# Patient Record
Sex: FEMALE | Race: WHITE | Employment: FULL TIME | URBAN - METROPOLITAN AREA
[De-identification: names, ages, dates, MRNs, and addresses within clinical notes are randomized per-mention and may not be internally consistent; named-entity substitution may affect disease eponyms.]

---

## 2017-03-08 DIAGNOSIS — R82.90 ABNORMAL FINDING IN URINE: ICD-10-CM

## 2017-03-08 DIAGNOSIS — Z79.899 HIGH RISK MEDICATION USE: Primary | ICD-10-CM

## 2017-03-08 LAB
ALBUMIN UR-MCNC: 30 MG/DL
APPEARANCE UR: CLEAR
BACTERIA #/AREA URNS HPF: ABNORMAL /HPF
BILIRUB UR QL STRIP: ABNORMAL
COLOR UR AUTO: YELLOW
GLUCOSE UR STRIP-MCNC: NEGATIVE MG/DL
HGB UR QL STRIP: ABNORMAL
KETONES UR STRIP-MCNC: 40 MG/DL
LEUKOCYTE ESTERASE UR QL STRIP: NEGATIVE
MUCOUS THREADS #/AREA URNS LPF: PRESENT /LPF
NITRATE UR QL: POSITIVE
NON-SQ EPI CELLS #/AREA URNS LPF: ABNORMAL /LPF
PH UR STRIP: 6 PH (ref 5–7)
RBC #/AREA URNS AUTO: ABNORMAL /HPF (ref 0–2)
SP GR UR STRIP: >1.03 (ref 1–1.03)
URN SPEC COLLECT METH UR: ABNORMAL
UROBILINOGEN UR STRIP-ACNC: 1 EU/DL (ref 0.2–1)
WBC #/AREA URNS AUTO: ABNORMAL /HPF (ref 0–2)

## 2017-03-08 PROCEDURE — 84443 ASSAY THYROID STIM HORMONE: CPT

## 2017-03-08 PROCEDURE — 84439 ASSAY OF FREE THYROXINE: CPT

## 2017-03-08 PROCEDURE — 87086 URINE CULTURE/COLONY COUNT: CPT

## 2017-03-08 PROCEDURE — 87088 URINE BACTERIA CULTURE: CPT

## 2017-03-08 PROCEDURE — 80048 BASIC METABOLIC PNL TOTAL CA: CPT

## 2017-03-08 PROCEDURE — 36415 COLL VENOUS BLD VENIPUNCTURE: CPT

## 2017-03-08 PROCEDURE — 81001 URINALYSIS AUTO W/SCOPE: CPT

## 2017-03-08 PROCEDURE — 80178 ASSAY OF LITHIUM: CPT

## 2017-03-08 PROCEDURE — 87186 SC STD MICRODIL/AGAR DIL: CPT

## 2017-03-09 LAB
ANION GAP SERPL CALCULATED.3IONS-SCNC: 7 MMOL/L (ref 3–14)
BUN SERPL-MCNC: 12 MG/DL (ref 7–30)
CALCIUM SERPL-MCNC: 9.7 MG/DL (ref 8.5–10.1)
CHLORIDE SERPL-SCNC: 107 MMOL/L (ref 94–109)
CO2 SERPL-SCNC: 26 MMOL/L (ref 20–32)
CREAT SERPL-MCNC: 0.66 MG/DL (ref 0.52–1.04)
GFR SERPL CREATININE-BSD FRML MDRD: NORMAL ML/MIN/1.7M2
GLUCOSE SERPL-MCNC: 84 MG/DL (ref 70–99)
LITHIUM SERPL-SCNC: <0.2 MMOL/L (ref 0.6–1.2)
POTASSIUM SERPL-SCNC: 4.1 MMOL/L (ref 3.4–5.3)
SODIUM SERPL-SCNC: 140 MMOL/L (ref 133–144)
T4 FREE SERPL-MCNC: 1.55 NG/DL (ref 0.76–1.46)
TSH SERPL DL<=0.05 MIU/L-ACNC: 0.79 MU/L (ref 0.4–4)

## 2017-03-10 LAB
BACTERIA SPEC CULT: ABNORMAL
MICRO REPORT STATUS: ABNORMAL
MICROORGANISM SPEC CULT: ABNORMAL
SPECIMEN SOURCE: ABNORMAL

## 2017-08-15 ENCOUNTER — ALLIED HEALTH/NURSE VISIT (OUTPATIENT)
Dept: NURSING | Facility: CLINIC | Age: 32
End: 2017-08-15
Payer: COMMERCIAL

## 2017-08-15 DIAGNOSIS — Z11.1 SCREENING EXAMINATION FOR PULMONARY TUBERCULOSIS: Primary | ICD-10-CM

## 2017-08-15 PROCEDURE — 86580 TB INTRADERMAL TEST: CPT

## 2017-08-15 NOTE — MR AVS SNAPSHOT
After Visit Summary   8/15/2017    Alexandra Ruiz    MRN: [id number]           Patient Information     Date Of Birth          1985        Visit Information        Provider Department      8/15/2017 10:30 AM NE ANCILLARY [facility]        Today's Diagnoses     Screening examination for pulmonary tuberculosis    -  1       Follow-ups after your visit        Your next 10 appointments already scheduled     Aug 17, 2017  1:00 PM CDT   Nurse Only with NE RN   [facility]    [address] [place] [id number] [phone]              Who to contact     If you have questions or need follow up information about today's clinic visit or your schedule please contact [facility] directly at [phone].  Normal or non-critical lab and imaging results will be communicated to you by MyChart, letter or phone within 4 business days after the clinic has received the results. If you do not hear from us within 7 days, please contact the clinic through MyChart or phone. If you have a critical or abnormal lab result, we will notify you by phone as soon as possible.  Submit refill requests through [employer] or call your pharmacy and they will forward the refill request to us. Please allow 3 business days for your refill to be completed.          Additional Information About Your Visit        MyChart Information     [employer] gives you secure access to your electronic health record. If you see a primary care provider, you can also send messages to your care team and make appointments. If you have questions, please call your primary care clinic.  If you do not have a primary care provider, please call [phone] and they will assist you.        Care EveryWhere ID     This is your Care EveryWhere ID. This could be used by other organizations to access your [place] medical records  [id number]         Blood  Pressure from Last 3 Encounters:   05/20/14 116/71   11/26/12 107/64   09/09/12 92/61    Weight from Last 3 Encounters:   05/20/14 144 lb (65.3 kg)   11/26/12 147 lb 3.2 oz (66.8 kg)   09/05/12 182 lb (82.6 kg)              We Performed the Following     TB INTRADERMAL TEST        Primary Care Provider    Physician No Ref-Primary       No address on file        Equal Access to Services     KENDALL CASTELLON : Hadii aad ku hadasho Soomaali, waaxda luqadaha, qaybta kaalmada adeegyada, waxay idiin hayaan adeeg luciebritton [place] . So [facility] [phone].    ATENCIÓN: Si ying montiel, tiene a parra disposición servicios gratuitos de asistencia lingüística. Llame al [phone].    We comply with applicable federal civil rights laws and [place] laws. We do not discriminate on the basis of race, color, national origin, age, disability sex, sexual orientation or gender identity.            Thank you!     Thank you for choosing [facility]  for your care. Our goal is always to provide you with excellent care. Hearing back from our patients is one way we can continue to improve our services. Please take a few minutes to complete the written survey that you may receive in the mail after your visit with us. Thank you!             Your Updated Medication List - Protect others around you: Learn how to safely use, store and throw away your medicines at www.disposemymeds.org.          This list is accurate as of: 8/15/17 10:38 AM.  Always use your most recent med list.                   Brand Name Dispense Instructions for use Diagnosis    amphetamine-dextroamphetamine 15 MG per 24 hr capsule    ADDERALL XR     Take 15 mg by mouth 2 times daily        norethindrone-ethinyl estradiol 1-20 MG-MCG per tablet    MICROGESTIN 1/20    3 Package    Take 1 tablet by mouth daily.    Routine postpartum follow-up       PRENATAL PO      Take  by mouth daily.        venlafaxine 75 MG 24 hr capsule    EFFEXOR-XR     Take by mouth daily         ZINC PO      Take 100 mg by mouth daily.

## 2017-08-15 NOTE — NURSING NOTE
The patient is asked the following questions today and these are her answers:    -Have you had a mantoux administered in the past 30 days?    No  -Have you had a previous positive Mantoux.  No  -Have you received BCG in the past.  No  -Have you had a live vaccine  (MMR, Varicella, OPV, Yellow Fever) in the last 6 weeks.  No  -Have you had and active  viral or bacterial infection in the past 6 weeks.  No  -Have you received corticosteroids or immunosuppressive agents in the past 6 weeks.  No  -Have you been diagnosed with HIV?  No  -Do you have a maglinancy?  No     Landy Burnette MA

## 2017-08-17 ENCOUNTER — ALLIED HEALTH/NURSE VISIT (OUTPATIENT)
Dept: NURSING | Facility: CLINIC | Age: 32
End: 2017-08-17

## 2017-08-17 DIAGNOSIS — Z11.1 SCREENING EXAMINATION FOR PULMONARY TUBERCULOSIS: Primary | ICD-10-CM

## 2017-08-17 LAB
PPDINDURATION: 0 MM (ref 0–5)
PPDREDNESS: 0 MM

## 2017-08-17 NOTE — MR AVS SNAPSHOT
After Visit Summary   8/17/2017    Alexandra Ruiz    MRN: [id number]           Patient Information     Date Of Birth          1985        Visit Information        Provider Department      8/17/2017 1:00 PM NE RN [facility]        Today's Diagnoses     Screening examination for pulmonary tuberculosis    -  1       Follow-ups after your visit        Who to contact     If you have questions or need follow up information about today's clinic visit or your schedule please contact [facility] directly at [phone].  Normal or non-critical lab and imaging results will be communicated to you by MyChart, letter or phone within 4 business days after the clinic has received the results. If you do not hear from us within 7 days, please contact the clinic through [employer] or phone. If you have a critical or abnormal lab result, we will notify you by phone as soon as possible.  Submit refill requests through [employer] or call your pharmacy and they will forward the refill request to us. Please allow 3 business days for your refill to be completed.          Additional Information About Your Visit        MyChart Information     [employer] gives you secure access to your electronic health record. If you see a primary care provider, you can also send messages to your care team and make appointments. If you have questions, please call your primary care clinic.  If you do not have a primary care provider, please call [phone] and they will assist you.        Care EveryWhere ID     This is your Care EveryWhere ID. This could be used by other organizations to access your [place] medical records  [id number]         Blood Pressure from Last 3 Encounters:   05/20/14 116/71   11/26/12 107/64   09/09/12 92/61    Weight from Last 3 Encounters:   05/20/14 144 lb (65.3 kg)   11/26/12 147 lb 3.2 oz (66.8 kg)   09/05/12 182 lb (82.6 kg)              Today, you had the following      No orders found for display       Primary Care Provider    Physician No Ref-Primary       No address on file        Equal Access to Services     KENDALL RAVINDER : Hadii mihaela sam kinjal Vaughn, veronikada jesarabella, trae neisharadha jimiroverto, waxkaran lalit mariyaluis krauseluna eason julius coreas. So [facility] [phone].    ATENCIÓN: Si habla español, tiene a parra disposición servicios gratuitos de asistencia lingüística. Llame al [phone].    We comply with applicable federal civil rights laws and [place] laws. We do not discriminate on the basis of race, color, national origin, age, disability sex, sexual orientation or gender identity.            Thank you!     Thank you for choosing [facility]  for your care. Our goal is always to provide you with excellent care. Hearing back from our patients is one way we can continue to improve our services. Please take a few minutes to complete the written survey that you may receive in the mail after your visit with us. Thank you!             Your Updated Medication List - Protect others around you: Learn how to safely use, store and throw away your medicines at www.disposemymeds.org.          This list is accurate as of: 8/17/17  1:05 PM.  Always use your most recent med list.                   Brand Name Dispense Instructions for use Diagnosis    amphetamine-dextroamphetamine 15 MG per 24 hr capsule    ADDERALL XR     Take 15 mg by mouth 2 times daily        norethindrone-ethinyl estradiol 1-20 MG-MCG per tablet    MICROGESTIN 1/20    3 Package    Take 1 tablet by mouth daily.    Routine postpartum follow-up       PRENATAL PO      Take  by mouth daily.        venlafaxine 75 MG 24 hr capsule    EFFEXOR-XR     Take by mouth daily        ZINC PO      Take 100 mg by mouth daily.

## 2017-08-17 NOTE — PROGRESS NOTES
Mantoux results NEGATIVE. No induration.  No swelling.  No redness..  Stephanie Phelan,Clinic Rn  [place]

## 2017-09-12 ENCOUNTER — ALLIED HEALTH/NURSE VISIT (OUTPATIENT)
Dept: NURSING | Facility: CLINIC | Age: 32
End: 2017-09-12
Payer: COMMERCIAL

## 2017-09-12 DIAGNOSIS — Z11.1 SCREENING EXAMINATION FOR PULMONARY TUBERCULOSIS: Primary | ICD-10-CM

## 2017-09-12 PROCEDURE — 86580 TB INTRADERMAL TEST: CPT

## 2017-09-12 NOTE — PROGRESS NOTES
The patient is asked the following questions today and these are her answers:    -Have you had a mantoux administered in the past 30 days?    Two step mantoux  -Have you had a previous positive Mantoux.  No  -Have you received BCG in the past.  No  -Have you had a live vaccine  (MMR, Varicella, OPV, Yellow Fever) in the last 6 weeks.  No  -Have you had and active  viral or bacterial infection in the past 6 weeks.  No  -Have you received corticosteroids or immunosuppressive agents in the past 6 weeks.  No  -Have you been diagnosed with HIV?  No  -Do you have a maglinancy?  No   Mary Dickinson CMA (AAMA)

## 2017-09-12 NOTE — MR AVS SNAPSHOT
After Visit Summary   9/12/2017    Alexandra Ruiz    MRN: [id number]           Patient Information     Date Of Birth          1985        Visit Information        Provider Department      9/12/2017 1:30 PM NE ANCILLARY [facility]        Today's Diagnoses     Screening examination for pulmonary tuberculosis    -  1       Follow-ups after your visit        Your next 10 appointments already scheduled     Sep 15, 2017  8:30 AM CDT   Nurse Only with NE RN   [facility]    [address] [place] [id number] [phone]              Who to contact     If you have questions or need follow up information about today's clinic visit or your schedule please contact [facility] directly at [phone].  Normal or non-critical lab and imaging results will be communicated to you by MyChart, letter or phone within 4 business days after the clinic has received the results. If you do not hear from us within 7 days, please contact the clinic through MyChart or phone. If you have a critical or abnormal lab result, we will notify you by phone as soon as possible.  Submit refill requests through "[employer] or call your pharmacy and they will forward the refill request to us. Please allow 3 business days for your refill to be completed.          Additional Information About Your Visit        MyChart Information     "[employer] gives you secure access to your electronic health record. If you see a primary care provider, you can also send messages to your care team and make appointments. If you have questions, please call your primary care clinic.  If you do not have a primary care provider, please call [phone] and they will assist you.        Care EveryWhere ID     This is your Care EveryWhere ID. This could be used by other organizations to access your [place] medical records  [id number]         Blood Pressure  from Last 3 Encounters:   05/20/14 116/71   11/26/12 107/64   09/09/12 92/61    Weight from Last 3 Encounters:   05/20/14 144 lb (65.3 kg)   11/26/12 147 lb 3.2 oz (66.8 kg)   09/05/12 182 lb (82.6 kg)              We Performed the Following     TB INTRADERMAL TEST     VACCINE ADMINISTRATION, INITIAL        Primary Care Provider    Physician No Ref-Primary       No address on file        Equal Access to Services     KENDALL CASTELLON : Hadii aad ku hadasho Soomaali, waaxda luqadaha, qaybta kaalmada adeegyada, waxay sumanin mariyan clara patiñomisaelbritton [place] . So [facility] [phone].    ATENCIÓN: Si ying montiel, tiene a parra disposición servicios gratuitos de asistencia lingüística. Llame al [phone].    We comply with applicable federal civil rights laws and [place] laws. We do not discriminate on the basis of race, color, national origin, age, disability sex, sexual orientation or gender identity.            Thank you!     Thank you for choosing [facility]  for your care. Our goal is always to provide you with excellent care. Hearing back from our patients is one way we can continue to improve our services. Please take a few minutes to complete the written survey that you may receive in the mail after your visit with us. Thank you!             Your Updated Medication List - Protect others around you: Learn how to safely use, store and throw away your medicines at www.disposemymeds.org.          This list is accurate as of: 9/12/17  2:32 PM.  Always use your most recent med list.                   Brand Name Dispense Instructions for use Diagnosis    amphetamine-dextroamphetamine 15 MG per 24 hr capsule    ADDERALL XR     Take 15 mg by mouth 2 times daily        norethindrone-ethinyl estradiol 1-20 MG-MCG per tablet    MICROGESTIN 1/20    3 Package    Take 1 tablet by mouth daily.    Routine postpartum follow-up       PRENATAL PO      Take  by mouth daily.        venlafaxine 75 MG 24 hr capsule     EFFEXOR-XR     Take by mouth daily        ZINC PO      Take 100 mg by mouth daily.

## 2017-09-22 ENCOUNTER — ALLIED HEALTH/NURSE VISIT (OUTPATIENT)
Dept: NURSING | Facility: CLINIC | Age: 32
End: 2017-09-22
Payer: COMMERCIAL

## 2017-09-22 DIAGNOSIS — Z11.1 SCREENING EXAMINATION FOR PULMONARY TUBERCULOSIS: Primary | ICD-10-CM

## 2017-09-22 PROCEDURE — 86580 TB INTRADERMAL TEST: CPT

## 2017-09-22 NOTE — MR AVS SNAPSHOT
After Visit Summary   9/22/2017    Alexandra Ruiz    MRN: [id number]           Patient Information     Date Of Birth          1985        Visit Information        Provider Department      9/22/2017 10:30 AM NE ANCILLARY [facility]        Today's Diagnoses     Screening examination for pulmonary tuberculosis    -  1       Follow-ups after your visit        Your next 10 appointments already scheduled     Sep 25, 2017  8:30 AM CDT   Nurse Only with NE RN   [facility]    [address] [place] [id number] [phone]              Who to contact     If you have questions or need follow up information about today's clinic visit or your schedule please contact [facility] directly at [phone].  Normal or non-critical lab and imaging results will be communicated to you by MyChart, letter or phone within 4 business days after the clinic has received the results. If you do not hear from us within 7 days, please contact the clinic through MyChart or phone. If you have a critical or abnormal lab result, we will notify you by phone as soon as possible.  Submit refill requests through [employer] or call your pharmacy and they will forward the refill request to us. Please allow 3 business days for your refill to be completed.          Additional Information About Your Visit        MyChart Information     [employer] gives you secure access to your electronic health record. If you see a primary care provider, you can also send messages to your care team and make appointments. If you have questions, please call your primary care clinic.  If you do not have a primary care provider, please call [phone] and they will assist you.        Care EveryWhere ID     This is your Care EveryWhere ID. This could be used by other organizations to access your [place] medical records  [id number]         Blood  Pressure from Last 3 Encounters:   05/20/14 116/71   11/26/12 107/64   09/09/12 92/61    Weight from Last 3 Encounters:   05/20/14 144 lb (65.3 kg)   11/26/12 147 lb 3.2 oz (66.8 kg)   09/05/12 182 lb (82.6 kg)              We Performed the Following     TB INTRADERMAL TEST        Primary Care Provider    Physician No Ref-Primary       No address on file        Equal Access to Services     KENDALL CASTELLON : Hadii aad ku hadasho Soomaali, waaxda luqadaha, qaybta kaalmada adeegyada, waxay idiin hayaan adeeg luciebritton [place] . So [facility] [phone].    ATENCIÓN: Si ying montiel, tiene a parra disposición servicios gratuitos de asistencia lingüística. Llame al [phone].    We comply with applicable federal civil rights laws and [place] laws. We do not discriminate on the basis of race, color, national origin, age, disability sex, sexual orientation or gender identity.            Thank you!     Thank you for choosing [facility]  for your care. Our goal is always to provide you with excellent care. Hearing back from our patients is one way we can continue to improve our services. Please take a few minutes to complete the written survey that you may receive in the mail after your visit with us. Thank you!             Your Updated Medication List - Protect others around you: Learn how to safely use, store and throw away your medicines at www.disposemymeds.org.          This list is accurate as of: 9/22/17 10:59 AM.  Always use your most recent med list.                   Brand Name Dispense Instructions for use Diagnosis    amphetamine-dextroamphetamine 15 MG per 24 hr capsule    ADDERALL XR     Take 15 mg by mouth 2 times daily        norethindrone-ethinyl estradiol 1-20 MG-MCG per tablet    MICROGESTIN 1/20    3 Package    Take 1 tablet by mouth daily.    Routine postpartum follow-up       PRENATAL PO      Take  by mouth daily.        venlafaxine 75 MG 24 hr capsule    EFFEXOR-XR     Take by mouth daily         ZINC PO      Take 100 mg by mouth daily.

## 2017-09-22 NOTE — NURSING NOTE
The following medication was given:     MEDICATION: MANTOUX PLACEMENT  ROUTE: ID  SITE: Forearm - Right  DOSE: 0.1mL  LOT #: [id number]  :  [employer]  EXPIRATION DATE:  11-16-18  Mariya Fung CMA

## 2017-09-22 NOTE — NURSING NOTE
The patient is asked the following questions today and these are her answers:    -Have you had a mantoux administered in the past 30 days?    Yes - doing 2 step mantoux  -Have you had a previous positive Mantoux.  No  -Have you received BCG in the past.  No  -Have you had a live vaccine  (MMR, Varicella, OPV, Yellow Fever) in the last 6 weeks.  No  -Have you had and active  viral or bacterial infection in the past 6 weeks.  No  -Have you received corticosteroids or immunosuppressive agents in the past 6 weeks.  No  -Have you been diagnosed with HIV?  No  -Do you have a maglinancy?  No   Mariya Fung, [facility]

## 2017-09-23 ENCOUNTER — NURSE TRIAGE (OUTPATIENT)
Dept: NURSING | Facility: CLINIC | Age: 32
End: 2017-09-23

## 2017-09-24 ENCOUNTER — TRANSFERRED RECORDS (OUTPATIENT)
Dept: HEALTH INFORMATION MANAGEMENT | Facility: CLINIC | Age: 32
End: 2017-09-24

## 2017-09-24 NOTE — TELEPHONE ENCOUNTER
"  Reason for Disposition    [1] SEVERE pain (e.g., excruciating, unable to do any normal activities) AND [2] not improved 2 hours after pain medicine    Additional Information    Negative: Sounds like a life-threatening emergency to the triager    Negative: Tooth knocked out    Negative: [1] Bleeding present > 30 minutes AND [2] using correct technique of direct pressure    Negative: [1] Bleeding now AND [2] second call after being instructed in correct technique of direct pressure    Negative: [1] Has packing sutured over socket (extraction site) AND [2] now bleeding around the packing (Exception: few drops or ooze)    Negative: Tongue is very swollen and tender    Negative: [1] Face is swollen AND [2] fever    Negative: Patient sounds very sick or weak to the triager    Answer Assessment - Initial Assessment Questions  1. SYMPTOM: \"What's the main symptom you're concerned about?\" (e.g., bleeding, pain, fever)      pain  2. ONSET: \"When did the ________  start?\"      Last week  3. DATE of TOOTH EXTRACTION: \"When was surgery performed?\"       9/21/17  4. BLEEDING: \"Is there any bleeding?\" If so, ask: \"How much?\"       no  5. PAIN: \"Is there any pain?\" If so, ask: \"How bad is it?\"  (Scale 1-10; or mild, moderate, severe)      severe  6. FEVER: \"Do you have a fever?\" If so, ask: \"What is your temperature, how was it measured, and when did it start?\"      no  7. OTHER SYMPTOMS: \"Do you have any other symptoms?\" (e.g., face swelling)      Swollen glands on neck    Protocols used: TOOTH EXTRACTION-ADULT-AH    "

## 2017-09-25 ENCOUNTER — ALLIED HEALTH/NURSE VISIT (OUTPATIENT)
Dept: NURSING | Facility: CLINIC | Age: 32
End: 2017-09-25
Payer: COMMERCIAL

## 2017-09-25 DIAGNOSIS — Z11.1 SCREENING EXAMINATION FOR PULMONARY TUBERCULOSIS: Primary | ICD-10-CM

## 2017-09-25 LAB
PPDINDURATION: 0 MM (ref 0–5)
PPDREDNESS: 0 MM

## 2017-09-25 PROCEDURE — 99207 ZZC NO CHARGE NURSE ONLY: CPT

## 2017-09-25 NOTE — PROGRESS NOTES
Mantoux results: No induration.  No swelling.  No redness.  Stephanie Phelan,Clinic Rn  [place]

## 2017-09-25 NOTE — MR AVS SNAPSHOT
After Visit Summary   9/25/2017    Alexandra Ruiz    MRN: [id number]           Patient Information     Date Of Birth          1985        Visit Information        Provider Department      9/25/2017 8:30 AM NE RN [facility]        Today's Diagnoses     Screening examination for pulmonary tuberculosis    -  1       Follow-ups after your visit        Your next 10 appointments already scheduled     Oct 09, 2017  1:00 PM CDT   Office Visit with Monik Lew PA-C   [facility] ([facility])    [address] [place] [id number] [phone]           Bring a current list of meds and any records pertaining to this visit. For Physicals, please bring immunization records and any forms needing to be filled out. Please arrive 10 minutes early to complete paperwork.              Who to contact     If you have questions or need follow up information about today's clinic visit or your schedule please contact [facility] directly at [phone].  Normal or non-critical lab and imaging results will be communicated to you by Platypihart, letter or phone within 4 business days after the clinic has received the results. If you do not hear from us within 7 days, please contact the clinic through Gremlnt or phone. If you have a critical or abnormal lab result, we will notify you by phone as soon as possible.  Submit refill requests through [employer] or call your pharmacy and they will forward the refill request to us. Please allow 3 business days for your refill to be completed.          Additional Information About Your Visit        PlatypiharAdvizzer Information     [employer] gives you secure access to your electronic health record. If you see a primary care provider, you can also send messages to your care team and make appointments. If you have questions, please call your primary care clinic.  If you do not have a primary care  provider, please call [phone] and they will assist you.        Care EveryWhere ID     This is your Care EveryWhere ID. This could be used by other organizations to access your [place] medical records  [id number]         Blood Pressure from Last 3 Encounters:   05/20/14 116/71   11/26/12 107/64   09/09/12 92/61    Weight from Last 3 Encounters:   05/20/14 144 lb (65.3 kg)   11/26/12 147 lb 3.2 oz (66.8 kg)   09/05/12 182 lb (82.6 kg)              Today, you had the following     No orders found for display       Primary Care Provider Office Phone # Fax #    Monik Lew PA-C [phone]       [address] [facility] [address]        Equal Access to Services     KENDALL CASTELLON : Hadii aad ku hadasho Soomaali, waaxda luqadaha, qaybta kaalmada adeegyada, gianni cohn haypaula [place] . So [facility] [phone].    ATENCIÓN: Si habla español, tiene a parra disposición servicios gratuitos de asistencia lingüística. Zoila al [phone].    We comply with applicable federal civil rights laws and [place] laws. We do not discriminate on the basis of race, color, national origin, age, disability sex, sexual orientation or gender identity.            Thank you!     Thank you for choosing [facility]  for your care. Our goal is always to provide you with excellent care. Hearing back from our patients is one way we can continue to improve our services. Please take a few minutes to complete the written survey that you may receive in the mail after your visit with us. Thank you!             Your Updated Medication List - Protect others around you: Learn how to safely use, store and throw away your medicines at www.disposemymeds.org.          This list is accurate as of: 9/25/17  9:12 AM.  Always use your most recent med list.                   Brand Name Dispense Instructions for use Diagnosis    amphetamine-dextroamphetamine 15 MG per 24 hr capsule    ADDERALL XR      Take 15 mg by mouth 2 times daily        norethindrone-ethinyl estradiol 1-20 MG-MCG per tablet    MICROGESTIN 1/20    3 Package    Take 1 tablet by mouth daily.    Routine postpartum follow-up       PRENATAL PO      Take  by mouth daily.        venlafaxine 75 MG 24 hr capsule    EFFEXOR-XR     Take by mouth daily        ZINC PO      Take 100 mg by mouth daily.

## 2017-10-09 ENCOUNTER — OFFICE VISIT (OUTPATIENT)
Dept: FAMILY MEDICINE | Facility: CLINIC | Age: 32
End: 2017-10-09
Payer: COMMERCIAL

## 2017-10-09 VITALS
HEIGHT: 66 IN | DIASTOLIC BLOOD PRESSURE: 64 MMHG | SYSTOLIC BLOOD PRESSURE: 112 MMHG | WEIGHT: 162 LBS | TEMPERATURE: 98.8 F | BODY MASS INDEX: 26.03 KG/M2 | HEART RATE: 80 BPM

## 2017-10-09 DIAGNOSIS — R21 RASH AND NONSPECIFIC SKIN ERUPTION: Primary | ICD-10-CM

## 2017-10-09 DIAGNOSIS — E83.2 ACRODERMATITIS ENTEROPATHICA: ICD-10-CM

## 2017-10-09 PROCEDURE — 84630 ASSAY OF ZINC: CPT | Mod: 90 | Performed by: PHYSICIAN ASSISTANT

## 2017-10-09 PROCEDURE — 84439 ASSAY OF FREE THYROXINE: CPT | Performed by: PHYSICIAN ASSISTANT

## 2017-10-09 PROCEDURE — 36415 COLL VENOUS BLD VENIPUNCTURE: CPT | Performed by: PHYSICIAN ASSISTANT

## 2017-10-09 PROCEDURE — 99000 SPECIMEN HANDLING OFFICE-LAB: CPT | Performed by: PHYSICIAN ASSISTANT

## 2017-10-09 PROCEDURE — 99214 OFFICE O/P EST MOD 30 MIN: CPT | Performed by: PHYSICIAN ASSISTANT

## 2017-10-09 PROCEDURE — 84443 ASSAY THYROID STIM HORMONE: CPT | Performed by: PHYSICIAN ASSISTANT

## 2017-10-09 NOTE — MR AVS SNAPSHOT
After Visit Summary   10/9/2017    Alexandra Ruiz    MRN: [id number]           Patient Information     Date Of Birth          1985        Visit Information        Provider Department      10/9/2017 1:00 PM Monik Lew PA-C [facility]        Today's Diagnoses     Acrodermatitis enteropathica    -  1    Rash and nonspecific skin eruption          Care Instructions    Call to schedule an appointment with Dermatology.  Monik or her team will be in touch with you with results.    BETY Han PA-C            Follow-ups after your visit        Additional Services     DERMATOLOGY REFERRAL       Your provider has referred you to: [facility]: Dermatology Clinic [facility] [phone]   [email or web address]    Please be aware that coverage of these services is subject to the terms and limitations of your health insurance plan.  Call member services at your health plan with any benefit or coverage questions.      Please bring the following with you to your appointment:    (1) Any X-Rays, CTs or MRIs which have been performed.  Contact the facility where they were done to arrange for  prior to your scheduled appointment.    (2) List of current medications  (3) This referral request   (4) Any documents/labs given to you for this referral                  Who to contact     If you have questions or need follow up information about today's clinic visit or your schedule please contact [facility] directly at [phone].  Normal or non-critical lab and imaging results will be communicated to you by MyChart, letter or phone within 4 business days after the clinic has received the results. If you do not hear from us within 7 days, please contact the clinic through MyChart or phone. If you have a critical or abnormal lab result, we will notify you by phone as soon as possible.  Submit refill requests through  "MyChart or call your pharmacy and they will forward the refill request to us. Please allow 3 business days for your refill to be completed.          Additional Information About Your Visit        MyChart Information     [employer] gives you secure access to your electronic health record. If you see a primary care provider, you can also send messages to your care team and make appointments. If you have questions, please call your primary care clinic.  If you do not have a primary care provider, please call [phone] and they will assist you.        Care EveryWhere ID     This is your Care EveryWhere ID. This could be used by other organizations to access your [place] medical records  [id number]        Your Vitals Were     Pulse Temperature Height BMI (Body Mass Index)          80 98.8  F (37.1  C) (Oral) 5' 6\" (1.676 m) 26.15 kg/m2         Blood Pressure from Last 3 Encounters:   10/09/17 112/64   05/20/14 116/71   11/26/12 107/64    Weight from Last 3 Encounters:   10/09/17 162 lb (73.5 kg)   05/20/14 144 lb (65.3 kg)   11/26/12 147 lb 3.2 oz (66.8 kg)              We Performed the Following     DERMATOLOGY REFERRAL     TSH with free T4 reflex     Zinc        Primary Care Provider Office Phone # Fax #    Monik Lew PA-C [phone]       [address] [facility] [address]        Equal Access to Services     [facility]: Hadii aad ku hadasho Soomaali, waaxda luqadaha, qaybta kaalmada adeegyada, gianni [place] . So [facility] [phone].    ATENCIÓN: Si habla español, tiene a parra disposición servicios gratuitos de asistencia lingüística. Zoila al [phone].    We comply with applicable federal civil rights laws and [place] laws. We do not discriminate on the basis of race, color, national origin, age, disability, sex, sexual orientation, or gender identity.            Thank you!     Thank you for choosing [facility]  for your care. Our " goal is always to provide you with excellent care. Hearing back from our patients is one way we can continue to improve our services. Please take a few minutes to complete the written survey that you may receive in the mail after your visit with us. Thank you!             Your Updated Medication List - Protect others around you: Learn how to safely use, store and throw away your medicines at www.disposemymeds.org.          This list is accurate as of: 10/9/17  2:04 PM.  Always use your most recent med list.                   Brand Name Dispense Instructions for use Diagnosis    amphetamine-dextroamphetamine 15 MG per 24 hr capsule    ADDERALL XR     Take 15 mg by mouth 2 times daily        DULOXETINE HCL PO      Take 20 mg by mouth daily Patient takes 60MG in the AM and 40MG in the PM        etonogestrel 68 MG Impl    IMPLANON/NEXPLANON     1 each by Subdermal route once        LAMOTRIGINE PO      Take 200 mg by mouth daily        QUETIAPINE FUMARATE PO      Take 25 mg by mouth as needed        venlafaxine 75 MG 24 hr capsule    EFFEXOR-XR     Take by mouth daily

## 2017-10-09 NOTE — NURSING NOTE
"Chief Complaint   Patient presents with     Derm Problem     LAB REQUEST     Zinc levels rechecked      LAB REQUEST     Thyroid - father has a history of thyroid cancer.        Initial /64 (BP Location: Right arm, Cuff Size: Adult Regular)  Pulse 80  Temp 98.8  F (37.1  C) (Oral)  Ht 5' 6\" (1.676 m)  Wt 162 lb (73.5 kg)  BMI 26.15 kg/m2 Estimated body mass index is 26.15 kg/(m^2) as calculated from the following:    Height as of this encounter: 5' 6\" (1.676 m).    Weight as of this encounter: 162 lb (73.5 kg).  Medication Reconciliation: complete     Angela Mcguire CMA (AAMA)      "

## 2017-10-09 NOTE — PATIENT INSTRUCTIONS
Call to schedule an appointment with Dermatology.  Monik or her team will be in touch with you with results.    BETY Han, PA-C

## 2017-10-09 NOTE — PROGRESS NOTES
SUBJECTIVE:   Alexandra Ruiz is a 31 year old female who presents to clinic today for the following health issues:    Rash  Onset: 2-3 weeks     Description:   Location: Around hairline   Character: Large round masses around hairline. Resembled a pimple, patient tired to pop them but nothing other than blood would come out. Would apply neosporin to help them heal.    Had several all over her hairline, base of neck. Several lesions in 1-2 months of a trouble.  Has not had any new lesions x 2 months.  Concerned about her thyroid.   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  At 8 months, lost all of the hair off of her body, developed blisters all over her body and was given this diagnosis.  Takes oral zinc sulfate tablets daily at night since she was a baby.  Itching (Pruritis): no     Progression of Symptoms:  improving    Accompanying Signs & Symptoms:  Fever: no   Body aches or joint pain: YES- swelling in the back of head  and neck   Sore throat symptoms: YES.   Also has red, raw, dry mouth.  Recent cold symptoms: no     History:   Previous similar rash: YES- Had these lesions about 1.5 months ago.   Has not had any new lesions in past 2 months. Only one small lesion persists.    Precipitating factors:   Exposure to similar rash: no   New exposures: Was exposed to strep last week   Recent travel: no     Alleviating factors:  Neosporin     Therapies Tried and outcome: Nothing       -------------------------------------    Problem list and histories reviewed & adjusted, as indicated.  Additional history: as documented    Reviewed and updated as needed this visit by clinical staffTobacco  Allergies  Meds  Med Hx  Surg Hx  Fam Hx  Soc Hx      Reviewed and updated as needed this visit by Provider         ROS:  Constitutional, HEENT, cardiovascular, pulmonary, gi and gu systems are negative, except as otherwise noted.      OBJECTIVE:   /64 (BP Location: Right arm, Cuff Size: Adult Regular)   "Pulse 80  Temp 98.8  F (37.1  C) (Oral)  Ht 5' 6\" (1.676 m)  Wt 162 lb (73.5 kg)  BMI 26.15 kg/m2  Body mass index is 26.15 kg/(m^2).  GENERAL: healthy, alert and no distress  RESP: lungs clear to auscultation - no rales, rhonchi or wheezes  CV: regular rate and rhythm, normal S1 S2, no S3 or S4, no murmur, click or rub, no peripheral edema and peripheral pulses strong  MS: no gross musculoskeletal defects noted, no edema  SKIN: R posterior neck/hairline with 1 cm erythematous, scabbed lesion without ttp.  PSYCH: mentation appears normal, affect normal/bright    Diagnostic Test Results:  none     ASSESSMENT/PLAN:   1. Rash and nonspecific skin eruption  2. Acrodermatitis enteropathica  Known history of the above skin condition.  Has not been as compliant with zinc recently.  Will recheck below labs, and pt to follow up with Derm.  She will increase her zinc to recommended dose.  - TSH with free T4 reflex  - Zinc  - DERMATOLOGY REFERRAL  - T4 free    Patient Instructions   Call to schedule an appointment with Dermatology.  Monik or her team will be in touch with you with results.    BETY Han, PA-C      Monik Lew PA-C  [facility]    "

## 2017-10-10 LAB
T4 FREE SERPL-MCNC: 1.06 NG/DL (ref 0.76–1.46)
TSH SERPL DL<=0.005 MIU/L-ACNC: 0.36 MU/L (ref 0.4–4)
ZINC SERPL-MCNC: 33 UG/DL (ref 60–120)

## 2017-10-16 NOTE — PROGRESS NOTES
Chart reviewed.  Encounter was not reviewed with provider.  Patient was not examined by me.  Maya Mcgowan MD

## 2017-11-19 ENCOUNTER — HEALTH MAINTENANCE LETTER (OUTPATIENT)
Age: 32
End: 2017-11-19

## 2018-05-03 ENCOUNTER — OFFICE VISIT (OUTPATIENT)
Dept: FAMILY MEDICINE | Facility: CLINIC | Age: 33
End: 2018-05-03
Payer: COMMERCIAL

## 2018-05-03 VITALS
TEMPERATURE: 98.2 F | BODY MASS INDEX: 23.4 KG/M2 | SYSTOLIC BLOOD PRESSURE: 112 MMHG | WEIGHT: 145 LBS | HEART RATE: 68 BPM | DIASTOLIC BLOOD PRESSURE: 68 MMHG

## 2018-05-03 DIAGNOSIS — N93.9 VAGINA BLEEDING: ICD-10-CM

## 2018-05-03 DIAGNOSIS — Z12.4 SCREENING FOR MALIGNANT NEOPLASM OF CERVIX: ICD-10-CM

## 2018-05-03 DIAGNOSIS — Z97.5 NEXPLANON IN PLACE: ICD-10-CM

## 2018-05-03 DIAGNOSIS — Z00.00 ROUTINE HISTORY AND PHYSICAL EXAMINATION OF ADULT: Primary | ICD-10-CM

## 2018-05-03 DIAGNOSIS — Z11.3 SCREENING EXAMINATION FOR VENEREAL DISEASE: ICD-10-CM

## 2018-05-03 PROCEDURE — 99213 OFFICE O/P EST LOW 20 MIN: CPT | Mod: 25 | Performed by: NURSE PRACTITIONER

## 2018-05-03 PROCEDURE — 99395 PREV VISIT EST AGE 18-39: CPT | Performed by: NURSE PRACTITIONER

## 2018-05-03 PROCEDURE — G0476 HPV COMBO ASSAY CA SCREEN: HCPCS | Performed by: NURSE PRACTITIONER

## 2018-05-03 PROCEDURE — G0124 SCREEN C/V THIN LAYER BY MD: HCPCS | Performed by: NURSE PRACTITIONER

## 2018-05-03 PROCEDURE — 87624 HPV HI-RISK TYP POOLED RSLT: CPT | Performed by: NURSE PRACTITIONER

## 2018-05-03 PROCEDURE — 36415 COLL VENOUS BLD VENIPUNCTURE: CPT | Performed by: NURSE PRACTITIONER

## 2018-05-03 PROCEDURE — 86803 HEPATITIS C AB TEST: CPT | Performed by: NURSE PRACTITIONER

## 2018-05-03 PROCEDURE — G0145 SCR C/V CYTO,THINLAYER,RESCR: HCPCS | Performed by: NURSE PRACTITIONER

## 2018-05-03 PROCEDURE — 87491 CHLMYD TRACH DNA AMP PROBE: CPT | Performed by: NURSE PRACTITIONER

## 2018-05-03 PROCEDURE — 87591 N.GONORRHOEAE DNA AMP PROB: CPT | Performed by: NURSE PRACTITIONER

## 2018-05-03 RX ORDER — NORETHINDRONE ACETATE AND ETHINYL ESTRADIOL 1MG-20(21)
1 KIT ORAL DAILY
Qty: 84 TABLET | Refills: 0 | Status: SHIPPED | OUTPATIENT
Start: 2018-05-03 | End: 2019-04-01

## 2018-05-03 NOTE — MR AVS SNAPSHOT
After Visit Summary   5/3/2018    Alexandra Ruiz    MRN: [id number]           Patient Information     Date Of Birth          1985        Visit Information        Provider Department      5/3/2018 1:00 PM Damari Stockton NP [facility]        Today's Diagnoses     Routine history and physical examination of adult    -  1    Screening for malignant neoplasm of cervix        Nexplanon in place        Vagina bleeding        Screening examination for venereal disease          Care Instructions    The birth control pill can interact with the Lamotrigine in that it could decrease the effects of the Lamictal      Preventive Health Recommendations  Female Ages 26 - 39  Yearly exam:   See your health care provider every year in order to    Review health changes.     Discuss preventive care.      Review your medicines if you your doctor has prescribed any.    Until age 30: Get a Pap test every three years (more often if you have had an abnormal result).    After age 30: Talk to your doctor about whether you should have a Pap test every 3 years or have a Pap test with HPV screening every 5 years.   You do not need a Pap test if your uterus was removed (hysterectomy) and you have not had cancer.  You should be tested each year for STDs (sexually transmitted diseases), if you're at risk.   Talk to your provider about how often to have your cholesterol checked.  If you are at risk for diabetes, you should have a diabetes test (fasting glucose).  Shots: Get a flu shot each year. Get a tetanus shot every 10 years.   Nutrition:     Eat at least 5 servings of fruits and vegetables each day.    Eat whole-grain bread, whole-wheat pasta and brown rice instead of white grains and rice.    Talk to your provider about Calcium and Vitamin D.     Lifestyle    Exercise at least 150 minutes a week (30 minutes a day, 5 days of the week). This will help you control your weight and prevent  disease.    Limit alcohol to one drink per day.    No smoking.     Wear sunscreen to prevent skin cancer.    See your dentist every six months for an exam and cleaning.      [facility]   Discharged by : Mary RAI CMA ([facility])    Paper scripts provided to patient : none      If you have any questions regarding your visit please contact your care team:     Team Gold                Clinic Hours Telephone Number     Dr. Maya Stockton NP 7am-7pm  Monday - Thursday   7am-5pm  Fridays  [phone]   (Appointment scheduling available 24/7)     RN Line  [phone] option 2     Urgent Care - [place] - 11am-9pm Monday-Friday Saturday-Sunday- 9am-5pm     [place] -   5pm-9pm Monday-Friday Saturday-Sunday- 9am-5pm    [phone] - [place]    [phone] - [place]       For a Price Quote for your services, please call our Consumer Price Line at [phone].     What options do I have for visits at the clinic other than the traditional office visit?     To expand how we care for you, many of our providers are utilizing electronic visits (e-visits) and telephone visits, when medically appropriate, for interactions with their patients rather than a visit in the clinic. We also offer nurse visits for many medical concerns. Just like any other service, we will bill your insurance company for this type of visit based on time spent on the phone with your provider. Not all insurance companies cover these visits. Please check with your medical insurance if this type of visit is covered. You will be responsible for any charges that are not paid by your insurance.   E-visits via Advanced Telemetry: generally incur a $35.00 fee.     Telephone visits:  Time spent on the phone: *charged based on time that is spent on the phone in increments of 10 minutes. Estimated cost:   5-10 mins $30.00   11-20 mins.  $59.00   21-30 mins. $85.00       Use Enecsys (secure email communication and access to your chart) to send your primary care provider a message or make an appointment. Ask someone on your Team how to sign up for Enecsys.     As always, Thank you for trusting us with your health care needs!      [facility]:    Scheduling Appointments  George Echols [facility]  Call: [phone]    [facility], [facility]  Call: [phone]    [facility]  Call: [phone]    For Gastroenterology referrals   [facility] G[facility]enterology   Clinics and Surgery Center, 4th Floor   [address] [place] [address]   Appointments: [phone]    WHERE TO GO FOR CARE?  Clinic    Make an appointment if you:       Are sick (cold, cough, flu, sore throat, earache or in pain).       Have a small injury (sprain, small cut, burn or broken bone).       Need a physical exam, Pap smear, vaccine or prescription refill.       Have questions about your health or medicines.    To reach us:      Call [phone] ([phone]). Open 24 hours every day. (For counseling services, call [phone].)    Log into Enecsys at [email or web address]. (Visit [email or web address] to create an account.) Hospital emergency room    An emergency is a serious or life- threatening problem that must be treated right away.    Call 605 or get to the hospital if you have:      Very bad or sudden:            - Chest pain or pressure         - Bleeding         - Head or belly pain         - Dizziness or trouble seeing, walking or                          Speaking      Problems breathing      Blood in your vomit or you are coughing up blood      A major injury (knocked out, loss of a finger or limb, rape, broken bone protruding from skin)    A mental health crisis. (Or call the Mental Health Crisis line at 1-929.602.9623 or Suicide Prevention Hotline at 1-552.442.4091.)    Open 24 hours every  day. You don't need an appointment.     Urgent care    Visit urgent care for sickness or small injuries when the clinic is closed. You don't need an appointment. To check hours or find an urgent care near you, visit [email or web address]. Online care    Get online care from [facility] for more than 70 common problems, like colds, allergies and infections. Open 24 hours every day at:   [email or web address]   Need help deciding?    For advice about where to be seen, you may call your clinic and ask to speak with a nurse. We're here for you 24 hours every day.         If you are deaf or hard of hearing, please let us know. We provide many free services including sign language interpreters, oral interpreters, TTYs, telephone amplifiers, note takers and written materials.                   Follow-ups after your visit        Who to contact     If you have questions or need follow up information about today's clinic visit or your schedule please contact [facility] directly at [phone].  Normal or non-critical lab and imaging results will be communicated to you by zeenworldhart, letter or phone within 4 business days after the clinic has received the results. If you do not hear from us within 7 days, please contact the clinic through FilmCrave or phone. If you have a critical or abnormal lab result, we will notify you by phone as soon as possible.  Submit refill requests through FilmCrave or call your pharmacy and they will forward the refill request to us. Please allow 3 business days for your refill to be completed.          Additional Information About Your Visit        FilmCrave Information     FilmCrave gives you secure access to your electronic health record. If you see a primary care provider, you can also send messages to your care team and make appointments. If you have questions, please call your primary care clinic.  If you do not have a primary care provider, please call [phone] and they will assist you.         Care EveryWhere ID     This is your Care EveryWhere ID. This could be used by other organizations to access your [facility] medical records  [id number]        Your Vitals Were     Pulse Temperature BMI (Body Mass Index)             68 98.2  F (36.8  C) (Oral) 23.4 kg/m2          Blood Pressure from Last 3 Encounters:   05/03/18 112/68   10/09/17 112/64   05/20/14 116/71    Weight from Last 3 Encounters:   05/03/18 145 lb (65.8 kg)   10/09/17 162 lb (73.5 kg)   05/20/14 144 lb (65.3 kg)              We Performed the Following     Chlamydia trachomatis PCR     Hepatitis C antibody     HPV High Risk Types DNA Cervical     Neisseria gonorrhoeae PCR     Pap imaged thin layer screen with HPV - recommended age 30 - 65 years (select HPV order below)          Today's Medication Changes          These changes are accurate as of 5/3/18  1:59 PM.  If you have any questions, ask your nurse or doctor.               Start taking these medicines.        Dose/Directions    norethindrone-ethinyl estradiol 1-20 MG-MCG per tablet   Commonly known as:  JUNEL FE 1/20   Used for:  Nexplanon in place, Vagina bleeding   Started by:  Damari Stockton NP        Dose:  1 tablet   Take 1 tablet by mouth daily   Quantity:  84 tablet   Refills:  0         Stop taking these medicines if you haven't already. Please contact your care team if you have questions.     amphetamine-dextroamphetamine 15 MG per 24 hr capsule   Commonly known as:  ADDERALL XR   Stopped by:  Damari Stockton NP           venlafaxine 75 MG 24 hr capsule   Commonly known as:  EFFEXOR-XR   Stopped by:  Damari Stockton NP                Where to get your medicines      These medications were sent to [facility] [address]  [address] [facility], [facility] [address]     Phone:  [phone]     norethindrone-ethinyl estradiol 1-20 MG-MCG per tablet                Primary Care Provider Office Phone # Fax #    Monik Alex  NATHALIE Lew [phone]       [address] [facility] [address]        Equal Access to Services     KENDALL CASTELLON : Hadii aad ku hadcalvinshahla Vaughn, watrevorda jeslingha, rodrigota kaalirezada luis fernando, gianni sumanin hayaaluis krauseluna eason julius coreas. So [facility] [phone].    ATENCIÓN: Si habla español, tiene a parra disposición servicios gratuitos de asistencia lingüística. Llame al [phone].    We comply with applicable federal civil rights laws and [place] laws. We do not discriminate on the basis of race, color, national origin, age, disability, sex, sexual orientation, or gender identity.            Thank you!     Thank you for choosing [facility]  for your care. Our goal is always to provide you with excellent care. Hearing back from our patients is one way we can continue to improve our services. Please take a few minutes to complete the written survey that you may receive in the mail after your visit with us. Thank you!             Your Updated Medication List - Protect others around you: Learn how to safely use, store and throw away your medicines at www.disposemymeds.org.          This list is accurate as of 5/3/18  1:59 PM.  Always use your most recent med list.                   Brand Name Dispense Instructions for use Diagnosis    DULOXETINE HCL PO      Take 20 mg by mouth daily Patient takes 60MG in the AM and 40MG in the PM        etonogestrel 68 MG Impl    IMPLANON/NEXPLANON     1 each by Subdermal route once        LAMOTRIGINE PO      Take 200 mg by mouth daily        norethindrone-ethinyl estradiol 1-20 MG-MCG per tablet    JUNEL FE 1/20    84 tablet    Take 1 tablet by mouth daily    Nexplanon in place, Vagina bleeding       QUETIAPINE FUMARATE PO      Take 25 mg by mouth as needed

## 2018-05-03 NOTE — PATIENT INSTRUCTIONS
The birth control pill can interact with the Lamotrigine in that it could decrease the effects of the Lamictal      Preventive Health Recommendations  Female Ages 26 - 39  Yearly exam:   See your health care provider every year in order to    Review health changes.     Discuss preventive care.      Review your medicines if you your doctor has prescribed any.    Until age 30: Get a Pap test every three years (more often if you have had an abnormal result).    After age 30: Talk to your doctor about whether you should have a Pap test every 3 years or have a Pap test with HPV screening every 5 years.   You do not need a Pap test if your uterus was removed (hysterectomy) and you have not had cancer.  You should be tested each year for STDs (sexually transmitted diseases), if you're at risk.   Talk to your provider about how often to have your cholesterol checked.  If you are at risk for diabetes, you should have a diabetes test (fasting glucose).  Shots: Get a flu shot each year. Get a tetanus shot every 10 years.   Nutrition:     Eat at least 5 servings of fruits and vegetables each day.    Eat whole-grain bread, whole-wheat pasta and brown rice instead of white grains and rice.    Talk to your provider about Calcium and Vitamin D.     Lifestyle    Exercise at least 150 minutes a week (30 minutes a day, 5 days of the week). This will help you control your weight and prevent disease.    Limit alcohol to one drink per day.    No smoking.     Wear sunscreen to prevent skin cancer.    See your dentist every six months for an exam and cleaning.      [facility]   Discharged by : Mary RAI CMA ([facility])    Paper scripts provided to patient : none      If you have any questions regarding your visit please contact your care team:     Team Gold                Clinic Hours Telephone Number     Dr. Maya Stockton, NP 7am-7pm  Monday -  Thursday   7am-5pm  Fridays  [phone]   (Appointment scheduling available 24/7)     RN Line  [phone] option 2     Urgent Care - [place] - 11am-9pm Monday-Friday Saturday-Sunday- 9am-5pm     [place] -   5pm-9pm Monday-Friday Saturday-Sunday- 9am-5pm    [phone] - [place]    [phone] - [place]       For a Price Quote for your services, please call our Consumer Price Line at [phone].     What options do I have for visits at the clinic other than the traditional office visit?     To expand how we care for you, many of our providers are utilizing electronic visits (e-visits) and telephone visits, when medically appropriate, for interactions with their patients rather than a visit in the clinic. We also offer nurse visits for many medical concerns. Just like any other service, we will bill your insurance company for this type of visit based on time spent on the phone with your provider. Not all insurance companies cover these visits. Please check with your medical insurance if this type of visit is covered. You will be responsible for any charges that are not paid by your insurance.   E-visits via [email or web address]: generally incur a $35.00 fee.     Telephone visits:  Time spent on the phone: *charged based on time that is spent on the phone in increments of 10 minutes. Estimated cost:   5-10 mins $30.00   11-20 mins. $59.00   21-30 mins. $85.00       Use CV Ingenuityt (secure email communication and access to your chart) to send your primary care provider a message or make an appointment. Ask someone on your Team how to sign up for [email or web address].     As always, Thank you for trusting us with your health care needs!      [facility]:    Scheduling Appointments  George Echols [place]  Call: [phone]    [facility]  Call: [phone]    [facility]  Call: [phone]    For  Gastroenterology referrals   [facility] Gastroenterology   Clinics and Surgery Center, 4th Floor   [address] [place] [address]   Appointments: [phone]    WHERE TO GO FOR CARE?  Clinic    Make an appointment if you:       Are sick (cold, cough, flu, sore throat, earache or in pain).       Have a small injury (sprain, small cut, burn or broken bone).       Need a physical exam, Pap smear, vaccine or prescription refill.       Have questions about your health or medicines.    To reach us:      Call [phone] ([phone]). Open 24 hours every day. (For counseling services, call [phone].)    Log into InSightec at BeFunky. (Visit Cinemagram to create an account.) Hospital emergency room    An emergency is a serious or life- threatening problem that must be treated right away.    Call [phone] or get to the hospital if you have:      Very bad or sudden:            - Chest pain or pressure         - Bleeding         - Head or belly pain         - Dizziness or trouble seeing, walking or                          Speaking      Problems breathing      Blood in your vomit or you are coughing up blood      A major injury (knocked out, loss of a finger or limb, rape, broken bone protruding from skin)    A mental health crisis. (Or call the Mental Health Crisis line at 1-852.665.5673 or Suicide Prevention Hotline at 1-223.903.5432.)    Open 24 hours every day. You don't need an appointment.     Urgent care    Visit urgent care for sickness or small injuries when the clinic is closed. You don't need an appointment. To check hours or find an urgent care near you, visit [email or web address]. Online care    Get online care from [facility] for more than 70 common problems, like colds, allergies and infections. Open 24 hours every day at:   [email or web address]   Need help deciding?    For advice about where to be seen, you may call your clinic and ask to speak with a nurse. We're here for you 24 hours  every day.         If you are deaf or hard of hearing, please let us know. We provide many free services including sign language interpreters, oral interpreters, TTYs, telephone amplifiers, note takers and written materials.

## 2018-05-03 NOTE — LETTER
May 14, 2018    Alexandra Bradychristianodominic  [address] [place] [address]      Dear ,      This letter is in regards to your recent cervical cancer screening (Pap smear and HPV test).    Your Pap smear result was reported as ASCUS or Atypical Squamous Cells of Undetermined Significance. This means that there were mildly abnormal cells found in the sample that we collected from your cervix, but no cancer cells were found. The vast majority of patients with this result do not have significant cervical abnormalities.     Your cervical sample was also tested for the presence of Human Papillomavirus (HPV). Your HPV test is NEGATIVE for high risk HPV, meaning that no HPV was found at this time.     Over time, your body can get rid of these abnormal cells, so it is recommended that you repeat your pap and HPV in 3 years.    If you have questions about these results contact [phone]    Please continue to be seen every year for an annual physical exam and other preventative tests.         Sincerely,    Damari Stockton NP/rocío

## 2018-05-03 NOTE — PROGRESS NOTES
SUBJECTIVE:   CC: Alexandra Ruiz is an 32 year old woman who presents for preventive health visit.     Physical   Annual:     Getting at least 3 servings of Calcium per day::  Yes    Bi-annual eye exam::  Yes    Dental care twice a year::  Yes    Sleep apnea or symptoms of sleep apnea::  Daytime drowsiness    Diet::  Regular (no restrictions)    Frequency of exercise::  6-7 days/week    Duration of exercise::  30-45 minutes    Taking medications regularly::  Yes    Medication side effects::  None    Additional concerns today::  No          STD   This is a new problem.     Boyfriend has hepatitis C and patient would like to be screened.   Patient has been bleeding for 2 months, heavier than normal. Patient reports a smell when taking out tampon. Also has a lot of clotting with bleeding.     Period for 2 months straight.  Had chlamydia  Has the implant contraception- due for exchange 10/2018.  Over a year ago was on the pill lowest dosage for 3 months.  This was prescribed due to having excessive bleeding as a side effect of the Nexplanon and it was effective for her.    The patient does smoke 3-4 per day.  Wants to quit, not ready now.    No hormonal contraceptive risk factors:  Thrombophlebitis or thromboembolic disorder; cerebrovascular or coronary artery disease; valvular heart disease with thrombogenic complications; uncontrolled hypertension; headaches with focal aura; breast cancer or any other estrogen-dependent cancer; undiagnosed genital bleeding; acute or chronic liver disease.     Today's PHQ-2 Score:   PHQ-2 ( 1999 [employer]) 5/3/2018   Q1: Little interest or pleasure in doing things 1   Q2: Feeling down, depressed or hopeless 1   PHQ-2 Score 2   Q1: Little interest or pleasure in doing things Several days   Q2: Feeling down, depressed or hopeless Several days   PHQ-2 Score 2       Abuse: Current or Past(Physical, Sexual or Emotional)- No  Do you feel safe in your environment - Yes    Social History    Substance Use Topics     Smoking status: Former Smoker     Packs/day: 0.50     Years: 9.00     Types: Cigarettes     Quit date: 2012     Smokeless tobacco: Never Used     Alcohol use No     Alcohol Use 5/3/2018   If you drink alcohol do you typically have greater than 3 drinks per day OR greater than 7 drinks per week? Not Applicable   No flowsheet data found.    Reviewed orders with patient.  Reviewed health maintenance and updated orders accordingly - Yes  BP Readings from Last 3 Encounters:   18 112/68   10/09/17 112/64   14 116/71    Wt Readings from Last 3 Encounters:   18 145 lb (65.8 kg)   10/09/17 162 lb (73.5 kg)   14 144 lb (65.3 kg)                  Patient Active Problem List   Diagnosis     Moderate major depression (H)     CARDIOVASCULAR SCREENING; LDL GOAL LESS THAN 160     Acrodermatitis enteropathica     ASCUS of cervix with negative high risk HPV     Past Surgical History:   Procedure Laterality Date      SECTION  2012    Procedure:  SECTION;;  Surgeon: Aleksandra Lin MD;  Location:  [facility]       Social History   Substance Use Topics     Smoking status: Former Smoker     Packs/day: 0.50     Years: 9.00     Types: Cigarettes     Quit date: 2012     Smokeless tobacco: Never Used     Alcohol use No     Family History   Problem Relation Age of Onset     CEREBROVASCULAR DISEASE Maternal Grandfather      C.A.D. Maternal Grandfather      Musculoskeletal Disorder Mother 47     MS     Thyroid Cancer Father      CANCER Paternal Grandfather          Current Outpatient Prescriptions   Medication Sig Dispense Refill     DULOXETINE HCL PO Take 20 mg by mouth daily Patient takes 60MG in the AM and 40MG in the PM       etonogestrel (IMPLANON/NEXPLANON) 68 MG IMPL 1 each by Subdermal route once       LAMOTRIGINE PO Take 200 mg by mouth daily               QUETIAPINE FUMARATE PO Take 25 mg by mouth as needed       Allergies   Allergen Reactions      Ceclor [Cefaclor Monohydrate]      Penicillins Rash       Mammogram not appropriate for this patient based on age.    Pertinent mammograms are reviewed under the imaging tab.  History of abnormal Pap smear: NO - age 30-65 PAP every 5 years with negative HPV co-testing recommended    Reviewed and updated as needed this visit by clinical staff  Tobacco  Allergies  Meds  Med Hx  Surg Hx  Fam Hx  Soc Hx        Reviewed and updated as needed this visit by Provider            Review of Systems  CONSTITUTIONAL: NEGATIVE for fever, chills, change in weight  INTEGUMENTARU/SKIN: NEGATIVE for worrisome rashes, moles or lesions  EYES: NEGATIVE for vision changes or irritation  ENT: NEGATIVE for ear, mouth and throat problems  RESP: NEGATIVE for significant cough or SOB  BREAST: NEGATIVE for masses, tenderness or discharge  CV: NEGATIVE for chest pain, palpitations or peripheral edema  GI: NEGATIVE for nausea, abdominal pain, heartburn, or change in bowel habits  : NEGATIVE for unusual urinary or vaginal symptoms. Periods are regular.  MUSCULOSKELETAL: NEGATIVE for significant arthralgias or myalgia  NEURO: NEGATIVE for weakness, dizziness or paresthesias  PSYCHIATRIC: NEGATIVE for changes in mood or affect     OBJECTIVE:   /68  Pulse 68  Temp 98.2  F (36.8  C) (Oral)  Wt 145 lb (65.8 kg)  BMI 23.4 kg/m2  Physical Exam  GENERAL: healthy, alert and no distress  EYES: Eyes grossly normal to inspection, PERRL and conjunctivae and sclerae normal  HENT: ear canals and TM's normal, nose and mouth without ulcers or lesions  NECK: no adenopathy, no asymmetry, masses, or scars and thyroid normal to palpation  RESP: lungs clear to auscultation - no rales, rhonchi or wheezes  BREAST: normal without masses, tenderness or nipple discharge and no palpable axillary masses or adenopathy  CV: regular rate and rhythm, normal S1 S2, no S3 or S4, no murmur, click or rub, no peripheral edema and peripheral pulses strong  ABDOMEN:  "soft, nontender, no hepatosplenomegaly, no masses and bowel sounds normal  :  Vaginal discharge small, Pap obtained.  MS: no gross musculoskeletal defects noted, no edema  SKIN: no suspicious lesions or rashes  NEURO: Normal strength and tone, mentation intact and speech normal  PSYCH: mentation appears normal, affect normal/bright    ASSESSMENT/PLAN:   1. Routine history and physical examination of adult    2. Screening for malignant neoplasm of cervix    - Pap imaged thin layer screen with HPV - recommended age 30 - 65 years (select HPV order below)  - HPV High Risk Types DNA Cervical    3. Nexplanon in place    - norethindrone-ethinyl estradiol (JUNEL FE 1/20) 1-20 MG-MCG per tablet; Take 1 tablet by mouth daily  Dispense: 84 tablet; Refill: 0    4. Vagina bleeding  Nexplanon is known to cause unpredictable bleeding.  She had used a low dose pill for 3 months in the past that helped.  Will trial this again.  - norethindrone-ethinyl estradiol (JUNEL FE 1/20) 1-20 MG-MCG per tablet; Take 1 tablet by mouth daily  Dispense: 84 tablet; Refill: 0    5. Screening examination for venereal disease    - Chlamydia trachomatis PCR  - Neisseria gonorrhoeae PCR  - Hepatitis C antibody    COUNSELING:  Reviewed preventive health counseling, as reflected in patient instructions       Regular exercise       Healthy diet/nutrition         reports that she quit smoking about 6 years ago. Her smoking use included Cigarettes. She has a 4.50 pack-year smoking history. She has never used smokeless tobacco.    Estimated body mass index is 23.4 kg/(m^2) as calculated from the following:    Height as of 10/9/17: 5' 6\" (1.676 m).    Weight as of this encounter: 145 lb (65.8 kg).   Weight management plan: Discussed healthy diet and exercise guidelines and patient will follow up in 12 months in clinic to re-evaluate.    Counseling Resources:  ATP IV Guidelines  Pooled Cohorts Equation Calculator  Breast Cancer Risk Calculator  FRAX Risk " Assessment  ICSI Preventive Guidelines  Dietary Guidelines for Americans, 2010  USDA's MyPlate  ASA Prophylaxis  Lung CA Screening    Damari Stockton NP  [facility]  Answers for HPI/ROS submitted by the patient on 5/3/2018   PHQ-2 Score: 2

## 2018-05-04 LAB
C TRACH DNA SPEC QL NAA+PROBE: NEGATIVE
HCV AB SERPL QL IA: NONREACTIVE
N GONORRHOEA DNA SPEC QL NAA+PROBE: NEGATIVE
SPECIMEN SOURCE: NORMAL
SPECIMEN SOURCE: NORMAL

## 2018-05-04 ASSESSMENT — PATIENT HEALTH QUESTIONNAIRE - PHQ9: SUM OF ALL RESPONSES TO PHQ QUESTIONS 1-9: 12

## 2018-05-09 LAB
COPATH REPORT: ABNORMAL
PAP: ABNORMAL

## 2018-05-10 PROBLEM — R87.610 ASCUS OF CERVIX WITH NEGATIVE HIGH RISK HPV: Status: ACTIVE | Noted: 2018-05-03

## 2018-05-10 LAB
FINAL DIAGNOSIS: NORMAL
HPV HR 12 DNA CVX QL NAA+PROBE: NEGATIVE
HPV16 DNA SPEC QL NAA+PROBE: NEGATIVE
HPV18 DNA SPEC QL NAA+PROBE: NEGATIVE
SPECIMEN DESCRIPTION: NORMAL
SPECIMEN SOURCE CVX/VAG CYTO: NORMAL

## 2018-06-14 ENCOUNTER — RADIANT APPOINTMENT (OUTPATIENT)
Dept: GENERAL RADIOLOGY | Facility: CLINIC | Age: 33
End: 2018-06-14
Attending: NURSE PRACTITIONER
Payer: COMMERCIAL

## 2018-06-14 ENCOUNTER — OFFICE VISIT (OUTPATIENT)
Dept: URGENT CARE | Facility: URGENT CARE | Age: 33
End: 2018-06-14
Payer: COMMERCIAL

## 2018-06-14 VITALS
TEMPERATURE: 98.4 F | DIASTOLIC BLOOD PRESSURE: 73 MMHG | SYSTOLIC BLOOD PRESSURE: 117 MMHG | OXYGEN SATURATION: 96 % | RESPIRATION RATE: 18 BRPM | HEART RATE: 80 BPM | WEIGHT: 145 LBS | BODY MASS INDEX: 23.4 KG/M2

## 2018-06-14 DIAGNOSIS — S99.921A FOOT INJURY, RIGHT, INITIAL ENCOUNTER: Primary | ICD-10-CM

## 2018-06-14 PROCEDURE — 99213 OFFICE O/P EST LOW 20 MIN: CPT | Performed by: NURSE PRACTITIONER

## 2018-06-14 PROCEDURE — 73630 X-RAY EXAM OF FOOT: CPT | Mod: RT

## 2018-06-14 ASSESSMENT — ENCOUNTER SYMPTOMS
SHORTNESS OF BREATH: 0
RHINORRHEA: 0
DIARRHEA: 0
FEVER: 0
HEADACHES: 0
SORE THROAT: 0
NAUSEA: 0
CHILLS: 0
COUGH: 0
VOMITING: 0

## 2018-06-14 ASSESSMENT — PAIN SCALES - GENERAL: PAINLEVEL: SEVERE PAIN (6)

## 2018-06-14 NOTE — MR AVS SNAPSHOT
After Visit Summary   6/14/2018    Alexandra Ruiz    MRN: [id number]           Patient Information     Date Of Birth          1985        Visit Information        Provider Department      6/14/2018 6:10 PM Edyta Carrillo NP [facility]        Today's Diagnoses     Foot injury, right, initial encounter    -  1      Care Instructions      Treating Strains and Sprains  Strains and sprains happen when muscles or other soft tissues near your bones stretch or tear. These injuries can cause bruising, swelling, and pain. To ease your discomfort and speed the healing of your strain or sprain, follow the tips below. Remember, a strain or sprain can take 6 to 8 weeks to heal.     Important Note: Do not give aspirin to children or teens without discussing it with your healthcare provider first.        Ice first, heat later    Use ice for the first 24 to 48 hours after injury. Ice helps prevent swelling and reduce pain. Ice the injury for no more than 20 minutes at a time and allow at least 20 minutes between icing sessions.    Apply heat after the first 72 hours, once the swelling has gone down. Heat relaxes muscles and increases blood flow. Soak the injured area in warm water or use a heating pad set on low for no more than 15 minutes at a time.  Wrap and elevate    Wrap an injured limb firmly with an elastic bandage. This provides support and helps prevent swelling. Don t wear an elastic bandage overnight. Watch for tingling, numbness, or increased pain. Remove the bandage immediately if any of these occurs.    Elevate the injured area to help reduce swelling and throbbing. It s best to raise an injured limb above the level of your heart.     Medicines    Over-the-counter medicines such as acetaminophen or ibuprofen can help reduce pain. Some also help reduce swelling.    Take medicine only as directed.    Rest the area even if medicines are controlling the pain.  Rest    Rest the  injured area by not using it for 24 hours.    When you re ready, return slowly to your normal activities. Rest the injured area often.    Don t use or walk on an injured limb if it hurts.  Date Last Reviewed: 1/1/2018 2000-2017 [employer]. [address] [place] [address]. All rights reserved. This information is not intended as a substitute for professional medical care. Always follow your healthcare professional's instructions.                Follow-ups after your visit        Who to contact     If you have questions or need follow up information about today's clinic visit or your schedule please contact [facility] directly at [phone].  Normal or non-critical lab and imaging results will be communicated to you by MyChart, letter or phone within 4 business days after the clinic has received the results. If you do not hear from us within 7 days, please contact the clinic through [employer] or phone. If you have a critical or abnormal lab result, we will notify you by phone as soon as possible.  Submit refill requests through [employer] or call your pharmacy and they will forward the refill request to us. Please allow 3 business days for your refill to be completed.          Additional Information About Your Visit        [employer] Information     [employer] gives you secure access to your electronic health record. If you see a primary care provider, you can also send messages to your care team and make appointments. If you have questions, please call your primary care clinic.  If you do not have a primary care provider, please call [phone] and they will assist you.        Care EveryWhere ID     This is your Care EveryWhere ID. This could be used by other organizations to access your [facility] medical records  [id number]        Your Vitals Were     Pulse Temperature Respirations Last Period Pulse Oximetry BMI (Body Mass Index)    80 98.4  F (36.9  C) (Oral) 18 05/24/2018  (Exact Date) 96% 23.4 kg/m2       Blood Pressure from Last 3 Encounters:   06/14/18 117/73   05/03/18 112/68   10/09/17 112/64    Weight from Last 3 Encounters:   06/14/18 145 lb (65.8 kg)   05/03/18 145 lb (65.8 kg)   10/09/17 162 lb (73.5 kg)              We Performed the Following     XR Foot Right G/E 3 Views          Today's Medication Changes          These changes are accurate as of 6/14/18  7:19 PM.  If you have any questions, ask your nurse or doctor.               Start taking these medicines.        Dose/Directions    acetaminophen-codeine 300-30 MG per tablet   Commonly known as:  TYLENOL #3   Used for:  Foot injury, right, initial encounter   Started by:  Edyta Carrillo NP        Dose:  1 tablet   Take 1 tablet by mouth every 6 hours as needed for severe pain   Quantity:  12 tablet   Refills:  0            Where to get your medicines      Some of these will need a paper prescription and others can be bought over the counter.  Ask your nurse if you have questions.     Bring a paper prescription for each of these medications     acetaminophen-codeine 300-30 MG per tablet               Information about OPIOIDS     PRESCRIPTION OPIOIDS: WHAT YOU NEED TO KNOW   We gave you an opioid (narcotic) pain medicine. It is important to manage your pain, but opioids are not always the best choice. You should first try all the other options your care team gave you. Take this medicine for as short a time (and as few doses) as possible.     These medicines have risks:    DO NOT drive when on new or higher doses of pain medicine. These medicines can affect your alertness and reaction times, and you could be arrested for driving under the influence (DUI). If you need to use opioids long-term, talk to your care team about driving.    DO NOT operate heave machinery    DO NOT do any other dangerous activities while taking these medicines.     DO NOT drink any alcohol while taking these medicines.      If the opioid prescribed  includes acetaminophen, DO NOT take with any other medicines that contain acetaminophen. Read all labels carefully. Look for the word  acetaminophen  or  Tylenol.  Ask your pharmacist if you have questions or are unsure.    You can get addicted to pain medicines, especially if you have a history of addiction (chemical, alcohol or substance dependence). Talk to your care team about ways to reduce this risk.    Store your pills in a secure place, locked if possible. We will not replace any lost or stolen medicine. If you don t finish your medicine, please throw away (dispose) as directed by your pharmacist. The [place] Pollution Control Agency has more information about safe disposal: https://www.pca.[facility].mn.us/living-green/managing-unwanted-medications.     All opioids tend to cause constipation. Drink plenty of water and eat foods that have a lot of fiber, such as fruits, vegetables, prune juice, apple juice and high-fiber cereal. Take a laxative (Miralax, milk of magnesia, Colace, Senna) if you don t move your bowels at least every other day.          Primary Care Provider Office Phone # Fax #    Monik Lew PA-C [phone]       [address] [place], [place] [id number]        Equal Access to Services     KENDALL CASTELLON : Jonathan hogano Soviolette, waaxda mark, qaybta kaalmada aderoverto, gianni coreas. So [facility] [phone].    ATENCIÓN: Si habla español, tiene a parra disposición servicios gratuitos de asistencia lingüística. Zoila al [phone].    We comply with applicable federal civil rights laws and [place] laws. We do not discriminate on the basis of race, color, national origin, age, disability, sex, sexual orientation, or gender identity.            Thank you!     Thank you for choosing [facility]  for your care. Our goal is always to provide you with excellent care. Hearing back from our patients is one way we can continue  to improve our services. Please take a few minutes to complete the written survey that you may receive in the mail after your visit with us. Thank you!             Your Updated Medication List - Protect others around you: Learn how to safely use, store and throw away your medicines at www.disposemymeds.org.          This list is accurate as of 6/14/18  7:19 PM.  Always use your most recent med list.                   Brand Name Dispense Instructions for use Diagnosis    acetaminophen-codeine 300-30 MG per tablet    TYLENOL #3    12 tablet    Take 1 tablet by mouth every 6 hours as needed for severe pain    Foot injury, right, initial encounter       DULOXETINE HCL PO      Take 20 mg by mouth daily Patient takes 60MG in the AM and 40MG in the PM        etonogestrel 68 MG Impl    IMPLANON/NEXPLANON     1 each by Subdermal route once        LAMOTRIGINE PO      Take 200 mg by mouth daily        norethindrone-ethinyl estradiol 1-20 MG-MCG per tablet    JUNEL FE 1/20    84 tablet    Take 1 tablet by mouth daily    Nexplanon in place, Vagina bleeding       QUETIAPINE FUMARATE PO      Take 25 mg by mouth as needed

## 2018-06-14 NOTE — PROGRESS NOTES
SUBJECTIVE:   Alexandra Ruiz is a 32 year old female presenting with a chief complaint of   Chief Complaint   Patient presents with     Foot Injury     Right foot injury x1 month would like x rays       She is an established patient of [facility].    MS Injury/Pain    Onset of symptoms was 1 month ago.  Location: right foot  Context:       The injury happened while at work      Mechanism: wheelchair ran on foot       Patient experienced delayed pain  Course of symptoms is worsening.    Severity moderate  Current and Associated symptoms: Pain and Decreased range of motion  Denies  Swelling, Bruising, Warmth and Stiffness  Aggravating Factors: walking, movement and flexion/extension  Therapies to improve symptoms include: ibuprofen  This is the first time this type of problem has occurred for this patient.       Review of Systems   Constitutional: Negative for chills and fever.   HENT: Negative for congestion, ear pain, rhinorrhea and sore throat.    Respiratory: Negative for cough and shortness of breath.    Gastrointestinal: Negative for diarrhea, nausea and vomiting.   Musculoskeletal:        Right foot injury   Neurological: Negative for headaches.   All other systems reviewed and are negative.      Past Medical History:   Diagnosis Date     Acrodermatitis enteropathica     zinc deficiency     Anxiety      ASCUS of cervix with negative high risk HPV 5/3/2018     Depression     TAKES MED.     Family History   Problem Relation Age of Onset     CEREBROVASCULAR DISEASE Maternal Grandfather      C.A.D. Maternal Grandfather      Musculoskeletal Disorder Mother 47     MS     Thyroid Cancer Father      CANCER Paternal Grandfather      Current Outpatient Prescriptions   Medication Sig Dispense Refill     acetaminophen-codeine (TYLENOL #3) 300-30 MG per tablet Take 1 tablet by mouth every 6 hours as needed for severe pain 12 tablet 0     DULOXETINE HCL PO Take 20 mg by mouth daily Patient takes 60MG in the AM and 40MG in  the PM       etonogestrel (IMPLANON/NEXPLANON) 68 MG IMPL 1 each by Subdermal route once       LAMOTRIGINE PO Take 200 mg by mouth daily       norethindrone-ethinyl estradiol (JUNEL FE 1/20) 1-20 MG-MCG per tablet Take 1 tablet by mouth daily 84 tablet 0     QUETIAPINE FUMARATE PO Take 25 mg by mouth as needed       Social History   Substance Use Topics     Smoking status: Former Smoker     Packs/day: 0.50     Years: 9.00     Types: Cigarettes     Quit date: 1/20/2012     Smokeless tobacco: Never Used     Alcohol use No       OBJECTIVE  /73 (BP Location: Left arm, Patient Position: Chair, Cuff Size: Adult Regular)  Pulse 80  Temp 98.4  F (36.9  C) (Oral)  Resp 18  Wt 145 lb (65.8 kg)  LMP 05/24/2018 (Exact Date)  SpO2 96%  BMI 23.4 kg/m2    Physical Exam   Constitutional: She appears well-developed and well-nourished. No distress.   Neck: Normal range of motion. Neck supple.   Pulmonary/Chest: Effort normal and breath sounds normal. No respiratory distress.   Musculoskeletal:   Tenderness of right foot close to first metatarsal, noted a bunion, ROM intact, pulses and sensation is intact.     Lymphadenopathy:     She has no cervical adenopathy.   Neurological: She is alert. No cranial nerve deficit.   Skin: Skin is warm and dry. She is not diaphoretic.   Psychiatric: She has a normal mood and affect.   Nursing note and vitals reviewed.      Labs:  Results for orders placed or performed in visit on 06/14/18 (from the past 24 hour(s))   XR Foot Right G/E 3 Views    Narrative    RIGHT FOOT THREE OR MORE VIEWS  6/14/2018 6:49 PM     HISTORY: Wheelchair ran on foot accidentally. Foot injury, right,  initial encounter.      Impression    IMPRESSION: Hallux valgus and bunion. No apparent fracture or  dislocation.       ASSESSMENT:      ICD-10-CM    1. Foot injury, right, initial encounter S99.921A XR Foot Right G/E 3 Views     acetaminophen-codeine (TYLENOL #3) 300-30 MG per tablet        PLAN:  Rest painful  area  ICE  Elevated  Pain medication as advised  Side effects of medication discussed  As pain subsides, stretching is advised  Consider physical therapy if pain is persisting after symptomatic treatment  All questions answered and patient is in agreement with treatment paln      Patient Instructions       Treating Strains and Sprains  Strains and sprains happen when muscles or other soft tissues near your bones stretch or tear. These injuries can cause bruising, swelling, and pain. To ease your discomfort and speed the healing of your strain or sprain, follow the tips below. Remember, a strain or sprain can take 6 to 8 weeks to heal.     Important Note: Do not give aspirin to children or teens without discussing it with your healthcare provider first.        Ice first, heat later    Use ice for the first 24 to 48 hours after injury. Ice helps prevent swelling and reduce pain. Ice the injury for no more than 20 minutes at a time and allow at least 20 minutes between icing sessions.    Apply heat after the first 72 hours, once the swelling has gone down. Heat relaxes muscles and increases blood flow. Soak the injured area in warm water or use a heating pad set on low for no more than 15 minutes at a time.  Wrap and elevate    Wrap an injured limb firmly with an elastic bandage. This provides support and helps prevent swelling. Don t wear an elastic bandage overnight. Watch for tingling, numbness, or increased pain. Remove the bandage immediately if any of these occurs.    Elevate the injured area to help reduce swelling and throbbing. It s best to raise an injured limb above the level of your heart.     Medicines    Over-the-counter medicines such as acetaminophen or ibuprofen can help reduce pain. Some also help reduce swelling.    Take medicine only as directed.    Rest the area even if medicines are controlling the pain.  Rest    Rest the injured area by not using it for 24 hours.    When you re ready, return  slowly to your normal activities. Rest the injured area often.    Don t use or walk on an injured limb if it hurts.  Date Last Reviewed: 1/1/2018 2000-2017 [employer]. [address] [place] [address], [place] [address]. All rights reserved. This information is not intended as a substitute for professional medical care. Always follow your healthcare professional's instructions.

## 2018-06-15 NOTE — PATIENT INSTRUCTIONS
Treating Strains and Sprains  Strains and sprains happen when muscles or other soft tissues near your bones stretch or tear. These injuries can cause bruising, swelling, and pain. To ease your discomfort and speed the healing of your strain or sprain, follow the tips below. Remember, a strain or sprain can take 6 to 8 weeks to heal.     Important Note: Do not give aspirin to children or teens without discussing it with your healthcare provider first.        Ice first, heat later    Use ice for the first 24 to 48 hours after injury. Ice helps prevent swelling and reduce pain. Ice the injury for no more than 20 minutes at a time and allow at least 20 minutes between icing sessions.    Apply heat after the first 72 hours, once the swelling has gone down. Heat relaxes muscles and increases blood flow. Soak the injured area in warm water or use a heating pad set on low for no more than 15 minutes at a time.  Wrap and elevate    Wrap an injured limb firmly with an elastic bandage. This provides support and helps prevent swelling. Don t wear an elastic bandage overnight. Watch for tingling, numbness, or increased pain. Remove the bandage immediately if any of these occurs.    Elevate the injured area to help reduce swelling and throbbing. It s best to raise an injured limb above the level of your heart.     Medicines    Over-the-counter medicines such as acetaminophen or ibuprofen can help reduce pain. Some also help reduce swelling.    Take medicine only as directed.    Rest the area even if medicines are controlling the pain.  Rest    Rest the injured area by not using it for 24 hours.    When you re ready, return slowly to your normal activities. Rest the injured area often.    Don t use or walk on an injured limb if it hurts.  Date Last Reviewed: 1/1/2018 2000-2017 [employer]. [address] [facility], [place] [address]. All rights reserved. This information is not intended as a substitute for  professional medical care. Always follow your healthcare professional's instructions.

## 2018-08-05 ENCOUNTER — TRANSFERRED RECORDS (OUTPATIENT)
Dept: HEALTH INFORMATION MANAGEMENT | Facility: CLINIC | Age: 33
End: 2018-08-05

## 2018-08-07 ENCOUNTER — OFFICE VISIT (OUTPATIENT)
Dept: FAMILY MEDICINE | Facility: CLINIC | Age: 33
End: 2018-08-07
Payer: COMMERCIAL

## 2018-08-07 VITALS
HEIGHT: 66 IN | OXYGEN SATURATION: 99 % | HEART RATE: 65 BPM | WEIGHT: 144 LBS | SYSTOLIC BLOOD PRESSURE: 109 MMHG | DIASTOLIC BLOOD PRESSURE: 71 MMHG | BODY MASS INDEX: 23.14 KG/M2 | TEMPERATURE: 98 F

## 2018-08-07 DIAGNOSIS — N76.0 BV (BACTERIAL VAGINOSIS): ICD-10-CM

## 2018-08-07 DIAGNOSIS — B96.89 BV (BACTERIAL VAGINOSIS): ICD-10-CM

## 2018-08-07 DIAGNOSIS — A60.04 HERPES SIMPLEX VULVOVAGINITIS: Primary | ICD-10-CM

## 2018-08-07 DIAGNOSIS — A59.01 TRICHOMONAL VAGINITIS: ICD-10-CM

## 2018-08-07 PROCEDURE — 99213 OFFICE O/P EST LOW 20 MIN: CPT | Performed by: PHYSICIAN ASSISTANT

## 2018-08-07 RX ORDER — METRONIDAZOLE 500 MG/1
500 TABLET ORAL
COMMUNITY
Start: 2018-08-05 | End: 2018-08-12

## 2018-08-07 RX ORDER — LORAZEPAM 0.5 MG/1
0.5 TABLET ORAL
COMMUNITY
Start: 2018-06-04

## 2018-08-07 RX ORDER — ACYCLOVIR 400 MG/1
400 TABLET ORAL
COMMUNITY
Start: 2018-08-05 | End: 2019-06-18

## 2018-08-07 NOTE — PROGRESS NOTES
SUBJECTIVE:   Alexandra Ruiz is a 32 year old female who presents to clinic today for the following health issues:      ED/UC Followup:    Facility:  [facility]  Date of visit: 18  Reason for visit: sores on private area that was told it could be herpes  Current Status: was given meds and would like an STD testing as they did not test for it in the UC     Was diagnosed with BV, trichomonas and genital herpes.   She is here today because she just wants to make sure it is herpes.   I reviewed care everywhere she also had negative chlamydia and gonorrhea testing.       Problem list and histories reviewed & adjusted, as indicated.  Additional history: as documented    Patient Active Problem List   Diagnosis     Moderate major depression (H)     CARDIOVASCULAR SCREENING; LDL GOAL LESS THAN 160     Acrodermatitis enteropathica     ASCUS of cervix with negative high risk HPV     Past Surgical History:   Procedure Laterality Date      SECTION  2012    Procedure:  SECTION;;  Surgeon: Aleksandra Lin MD;  Location:  L+D       Social History   Substance Use Topics     Smoking status: Former Smoker     Packs/day: 0.50     Years: 9.00     Types: Cigarettes     Quit date: 2012     Smokeless tobacco: Never Used     Alcohol use No     Family History   Problem Relation Age of Onset     Cerebrovascular Disease Maternal Grandfather      C.A.D. Maternal Grandfather      Musculoskeletal Disorder Mother 47     MS     Thyroid Cancer Father      Cancer Paternal Grandfather            Reviewed and updated as needed this visit by clinical staff  Tobacco  Allergies  Meds  Problems  Med Hx  Surg Hx  Fam Hx  Soc Hx        Reviewed and updated as needed this visit by Provider  Allergies  Meds  Problems         ROS:  Constitutional, HEENT, cardiovascular, pulmonary, gi and gu systems are negative, except as otherwise noted.    OBJECTIVE:     /71 (BP Location: Right arm,  "Patient Position: Chair, Cuff Size: Adult Regular)  Pulse 65  Temp 98  F (36.7  C) (Oral)  Ht 5' 5.95\" (1.675 m)  Wt 144 lb (65.3 kg)  SpO2 99%  BMI 23.28 kg/m2  Body mass index is 23.28 kg/(m^2).  GENERAL: healthy, alert and no distress   (female): normal female external genitalia with 2 small healing sores on the bilateral inner labia.     Diagnostic Test Results:  none     ASSESSMENT/PLAN:       ICD-10-CM    1. Herpes simplex vulvovaginitis A60.04    2. BV (bacterial vaginosis) N76.0     B96.89    3. Trichomonal vaginitis A59.01    Confirmed clinically it appears to be HSV. Patient to complete acyclovir course as prescribed. Patient will discuss the trich diagnosis with her partner and ensure both of them are treated for 7 days prior to resuming intercourse.   Offered HIV, Hep C and RPR testing and patient declined.     FUTURE APPOINTMENTS:       - Follow-up for annual visit or as needed    Kiera Ashby PA-C  [facility]  "

## 2018-08-07 NOTE — MR AVS SNAPSHOT
"              After Visit Summary   8/7/2018    Alexandra Ruiz    MRN: [id number]           Patient Information     Date Of Birth          1985        Visit Information        Provider Department      8/7/2018 1:00 PM Kiera Ashby PA-C [facility]         Follow-ups after your visit        Who to contact     If you have questions or need follow up information about today's clinic visit or your schedule please contact [facility] directly at [phone].  Normal or non-critical lab and imaging results will be communicated to you by MyChart, letter or phone within 4 business days after the clinic has received the results. If you do not hear from us within 7 days, please contact the clinic through [employer] or phone. If you have a critical or abnormal lab result, we will notify you by phone as soon as possible.  Submit refill requests through [employer] or call your pharmacy and they will forward the refill request to us. Please allow 3 business days for your refill to be completed.          Additional Information About Your Visit        MyChart Information     [employer] gives you secure access to your electronic health record. If you see a primary care provider, you can also send messages to your care team and make appointments. If you have questions, please call your primary care clinic.  If you do not have a primary care provider, please call [phone] and they will assist you.        Care EveryWhere ID     This is your Care EveryWhere ID. This could be used by other organizations to access your [place] medical records  [id number]        Your Vitals Were     Pulse Temperature Height Pulse Oximetry BMI (Body Mass Index)       65 98  F (36.7  C) (Oral) 5' 5.95\" (1.675 m) 99% 23.28 kg/m2        Blood Pressure from Last 3 Encounters:   08/07/18 109/71   06/14/18 117/73   05/03/18 112/68    Weight from Last 3 Encounters:   08/07/18 144 lb (65.3 kg)   06/14/18 145 " lb (65.8 kg)   05/03/18 145 lb (65.8 kg)              Today, you had the following     No orders found for display       Primary Care Provider Office Phone # Fax #    Monik Lew PA-C [phone]       [address] [facility] [address]        Equal Access to Services     [facility]ICA [facility] : Hadii aad ku hadasho Soomaali, waaxda luqadaha, qaybta kaalmada adeegyada, waxay idiin hayaan adeeg khmisaelbritton madrid . So [facility] [phone].    ATENCIÓN: Si habla español, tiene a parra disposición servicios gratuitos de asistencia lingüística. Gregame al [phone].    We comply with applicable federal civil rights laws and [place] laws. We do not discriminate on the basis of race, color, national origin, age, disability, sex, sexual orientation, or gender identity.            Thank you!     Thank you for choosing [facility]  for your care. Our goal is always to provide you with excellent care. Hearing back from our patients is one way we can continue to improve our services. Please take a few minutes to complete the written survey that you may receive in the mail after your visit with us. Thank you!             Your Updated Medication List - Protect others around you: Learn how to safely use, store and throw away your medicines at www.disposemymeds.org.          This list is accurate as of 8/7/18  1:24 PM.  Always use your most recent med list.                   Brand Name Dispense Instructions for use Diagnosis    acyclovir 400 MG tablet    ZOVIRAX     Take 400 mg by mouth        DULOXETINE HCL PO      Take 20 mg by mouth daily Patient takes 60MG in the AM and 40MG in the PM        etonogestrel 68 MG Impl    IMPLANON/NEXPLANON     1 each by Subdermal route once        LAMOTRIGINE PO      Take 200 mg by mouth daily        LORazepam 0.5 MG tablet    ATIVAN     Take 0.5 mg by mouth        metroNIDAZOLE 500 MG tablet    FLAGYL     Take 500 mg by mouth        norethindrone-ethinyl  estradiol 1-20 MG-MCG per tablet    JUNEL FE 1/20    84 tablet    Take 1 tablet by mouth daily    Nexplanon in place, Vagina bleeding       QUETIAPINE FUMARATE PO      Take 25 mg by mouth as needed

## 2018-10-24 ENCOUNTER — OFFICE VISIT (OUTPATIENT)
Dept: FAMILY MEDICINE | Facility: CLINIC | Age: 33
End: 2018-10-24
Payer: COMMERCIAL

## 2018-10-24 VITALS
BODY MASS INDEX: 23.08 KG/M2 | HEART RATE: 72 BPM | TEMPERATURE: 98.3 F | DIASTOLIC BLOOD PRESSURE: 60 MMHG | SYSTOLIC BLOOD PRESSURE: 122 MMHG | WEIGHT: 143.6 LBS | HEIGHT: 66 IN

## 2018-10-24 DIAGNOSIS — J06.9 ACUTE UPPER RESPIRATORY INFECTION: Primary | ICD-10-CM

## 2018-10-24 DIAGNOSIS — J02.9 SORE THROAT: ICD-10-CM

## 2018-10-24 LAB
DEPRECATED S PYO AG THROAT QL EIA: NORMAL
SPECIMEN SOURCE: NORMAL

## 2018-10-24 PROCEDURE — 87081 CULTURE SCREEN ONLY: CPT | Performed by: NURSE PRACTITIONER

## 2018-10-24 PROCEDURE — 99213 OFFICE O/P EST LOW 20 MIN: CPT | Performed by: NURSE PRACTITIONER

## 2018-10-24 PROCEDURE — 87880 STREP A ASSAY W/OPTIC: CPT | Performed by: NURSE PRACTITIONER

## 2018-10-24 ASSESSMENT — PAIN SCALES - GENERAL: PAINLEVEL: SEVERE PAIN (7)

## 2018-10-24 NOTE — PATIENT INSTRUCTIONS
Start over the counter Zicam (zinc dissolvable tablet) and use as directed.  This could decrease the number of days of a viral infection.    Return for flu shot when feeling better    Viral Upper Respiratory Illness (Adult)  You have a viral upper respiratory illness (URI), which is another term for the common cold. This illness is contagious during the first few days. It is spread through the air by coughing and sneezing. It may also be spread by direct contact (touching the sick person and then touching your own eyes, nose, or mouth). Frequent handwashing will decrease risk of spread. Most viral illnesses go away within 7 to 10 days with rest and simple home remedies. Sometimes the illness may last for several weeks. Antibiotics will not kill a virus, and they are generally not prescribed for this condition.    Home care    If symptoms are severe, rest at home for the first 2 to 3 days. When you resume activity, don't let yourself get too tired.    Avoid being exposed to cigarette smoke (yours or others ).    You may use acetaminophen or ibuprofen to control pain and fever, unless another medicine was prescribed. If you have chronic liver or kidney disease, have ever had a stomach ulcer or gastrointestinal bleeding, or are taking blood-thinning medicines, talk with your healthcare provider before using these medicines. Aspirin should never be given to anyone under 18 years of age who is ill with a viral infection or fever. It may cause severe liver or brain damage.    Your appetite may be poor, so a light diet is fine. Avoid dehydration by drinking 6 to 8 glasses of fluids per day (water, soft drinks, juices, tea, or soup). Extra fluids will help loosen secretions in the nose and lungs.    Over-the-counter cold medicines will not shorten the length of time you re sick, but they may be helpful for the following symptoms: cough, sore throat, and nasal and sinus congestion. (Note: Do not use decongestants if you  have high blood pressure.)  Follow-up care  Follow up with your healthcare provider, or as advised.  When to seek medical advice  Call your healthcare provider right away if any of these occur:    Cough with lots of colored sputum (mucus)    Severe headache; face, neck, or ear pain    Difficulty swallowing due to throat pain    Fever of 100.4 F (38 C) or higher, or as directed by your healthcare provider  Call 911  Call 911 if any of these occur:    Chest pain, shortness of breath, wheezing, or difficulty breathing    Coughing up blood    Inability to swallow due to throat pain  Date Last Reviewed: 9/13/2015 2000-2017 [employer]. [address] [place] [address], [place] [address]. All rights reserved. This information is not intended as a substitute for professional medical care. Always follow your healthcare professional's instructions.

## 2018-10-24 NOTE — PROGRESS NOTES
SUBJECTIVE:   Alexandra Ruiz is a 32 year old female who presents to clinic today for the following health issues:      Acute Illness   Acute illness concerns: Cough and sore throat  Onset: Last night <24 hours ago    Fever: no     Chills/Sweats: YES, both    Headache (location?): YES    Sinus Pressure:no    Conjunctivitis:  no    Ear Pain: no    Rhinorrhea: no    Congestion: YES    Sore Throat: YES     Cough: YES-productive    Wheeze: no     Decreased Appetite: YES    Nausea: no     Vomiting: no    Diarrhea:  no    Dysuria/Freq.: no     Fatigue/Achiness: YES- both    Sick/Strep Exposure: yes, family sick with similar symptoms     Therapies Tried and outcome: tylenol and robitussin has helped  Has not had her flu shot yet and would rather wait to get this when she is feeling better.  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  She called into work today due to her symptoms and she has tomorrow off work.    Problem list and histories reviewed & adjusted, as indicated.  Additional history: as documented    Patient Active Problem List   Diagnosis     Moderate major depression (H)     CARDIOVASCULAR SCREENING; LDL GOAL LESS THAN 160     Acrodermatitis enteropathica     ASCUS of cervix with negative high risk HPV     Past Surgical History:   Procedure Laterality Date      SECTION  2012    Procedure:  SECTION;;  Surgeon: Aleksandra Lin MD;  Location:  L+D       Social History   Substance Use Topics     Smoking status: Former Smoker     Packs/day: 0.50     Years: 9.00     Types: Cigarettes     Quit date: 2012     Smokeless tobacco: Never Used     Alcohol use No     Family History   Problem Relation Age of Onset     Cerebrovascular Disease Maternal Grandfather      BREANNE Maternal Grandfather      Musculoskeletal Disorder Mother 47     MS     Thyroid Cancer Father      Cancer Paternal Grandfather          Current Outpatient Prescriptions  "  Medication Sig Dispense Refill     acyclovir (ZOVIRAX) 400 MG tablet Take 400 mg by mouth       DULOXETINE HCL PO Take 20 mg by mouth daily Patient takes 60MG in the AM and 40MG in the PM       etonogestrel (IMPLANON/NEXPLANON) 68 MG IMPL 1 each by Subdermal route once       LAMOTRIGINE PO Take 200 mg by mouth daily       LORazepam (ATIVAN) 0.5 MG tablet Take 0.5 mg by mouth       norethindrone-ethinyl estradiol (JUNEL FE 1/20) 1-20 MG-MCG per tablet Take 1 tablet by mouth daily 84 tablet 0     QUETIAPINE FUMARATE PO Take 25 mg by mouth as needed       Allergies   Allergen Reactions     Ceclor [Cefaclor Monohydrate]      Penicillins Rash     BP Readings from Last 3 Encounters:   10/24/18 122/60   08/07/18 109/71   06/14/18 117/73    Wt Readings from Last 3 Encounters:   10/24/18 143 lb 9.6 oz (65.1 kg)   08/07/18 144 lb (65.3 kg)   06/14/18 145 lb (65.8 kg)                    Reviewed and updated as needed this visit by clinical staff  Tobacco  Allergies  Meds  Problems  Med Hx  Surg Hx  Fam Hx  Soc Hx        Reviewed and updated as needed this visit by Provider  Allergies  Meds  Problems         ROS:  Constitutional, HEENT, cardiovascular, pulmonary, gi and gu systems are negative, except as otherwise noted.    OBJECTIVE:     /60 (BP Location: Right arm, Patient Position: Chair, Cuff Size: Adult Regular)  Pulse 72  Temp 98.3  F (36.8  C) (Oral)  Ht 5' 5.95\" (1.675 m)  Wt 143 lb 9.6 oz (65.1 kg)  BMI 23.21 kg/m2  Body mass index is 23.21 kg/(m^2).  GENERAL: healthy, alert and no distress  EYES: Eyes grossly normal to inspection, PERRL and conjunctivae and sclerae normal  HENT: ear canals and TM's normal, nose and mouth without ulcers or lesions  NECK: no asymmetry, masses, or scars, thyroid normal to palpation and left tonsillar adenopathy  RESP: lungs clear to auscultation - no rales, rhonchi or wheezes  CV: regular rate and rhythm, normal S1 S2, no S3 or S4, no murmur, click or rub, no " peripheral edema and peripheral pulses strong  PSYCH: mentation appears normal, affect normal/bright    Diagnostic Test Results:  Results for orders placed or performed in visit on 10/24/18   Strep, Rapid Screen   Result Value Ref Range    Specimen Description Throat     Rapid Strep A Screen       NEGATIVE: No Group A streptococcal antigen detected by immunoassay, await culture report.       ASSESSMENT/PLAN:     1. Acute upper respiratory infection    2. Sore throat    - Strep, Rapid Screen  - Beta strep group A culture    - Supportive cares discussed including fluids, rest.  May continue over the counter tylenol and Robitussin as needed for symptoms relief.  Advised to start over the counter Zicam.    Return for Nexplanon removal as patient is due for this.  Patient states she is not currently sexually active and will return for this.    Damari Stockton, NP  [facility]

## 2018-10-24 NOTE — MR AVS SNAPSHOT
After Visit Summary   10/24/2018    Alexandra Ruiz    MRN: [id number]           Patient Information     Date Of Birth          1985        Visit Information        Provider Department      10/24/2018 2:40 PM Damari Stockton NP [facility]        Today's Diagnoses     Acute upper respiratory infection    -  1    Sore throat          Care Instructions      Start over the counter Zicam (zinc dissolvable tablet) and use as directed.  This could decrease the number of days of a viral infection.    Return for flu shot when feeling better    Viral Upper Respiratory Illness (Adult)  You have a viral upper respiratory illness (URI), which is another term for the common cold. This illness is contagious during the first few days. It is spread through the air by coughing and sneezing. It may also be spread by direct contact (touching the sick person and then touching your own eyes, nose, or mouth). Frequent handwashing will decrease risk of spread. Most viral illnesses go away within 7 to 10 days with rest and simple home remedies. Sometimes the illness may last for several weeks. Antibiotics will not kill a virus, and they are generally not prescribed for this condition.    Home care    If symptoms are severe, rest at home for the first 2 to 3 days. When you resume activity, don't let yourself get too tired.    Avoid being exposed to cigarette smoke (yours or others ).    You may use acetaminophen or ibuprofen to control pain and fever, unless another medicine was prescribed. If you have chronic liver or kidney disease, have ever had a stomach ulcer or gastrointestinal bleeding, or are taking blood-thinning medicines, talk with your healthcare provider before using these medicines. Aspirin should never be given to anyone under 18 years of age who is ill with a viral infection or fever. It may cause severe liver or brain damage.    Your appetite may be poor, so a light diet is fine.  Avoid dehydration by drinking 6 to 8 glasses of fluids per day (water, soft drinks, juices, tea, or soup). Extra fluids will help loosen secretions in the nose and lungs.    Over-the-counter cold medicines will not shorten the length of time you re sick, but they may be helpful for the following symptoms: cough, sore throat, and nasal and sinus congestion. (Note: Do not use decongestants if you have high blood pressure.)  Follow-up care  Follow up with your healthcare provider, or as advised.  When to seek medical advice  Call your healthcare provider right away if any of these occur:    Cough with lots of colored sputum (mucus)    Severe headache; face, neck, or ear pain    Difficulty swallowing due to throat pain    Fever of 100.4 F (38 C) or higher, or as directed by your healthcare provider  Call 911  Call 911 if any of these occur:    Chest pain, shortness of breath, wheezing, or difficulty breathing    Coughing up blood    Inability to swallow due to throat pain  Date Last Reviewed: 9/13/2015 2000-2017 [employer]. [address] [place] [address]. All rights reserved. This information is not intended as a substitute for professional medical care. Always follow your healthcare professional's instructions.                Follow-ups after your visit        Who to contact     If you have questions or need follow up information about today's clinic visit or your schedule please contact [facility] directly at [phone].  Normal or non-critical lab and imaging results will be communicated to you by MyChart, letter or phone within 4 business days after the clinic has received the results. If you do not hear from us within 7 days, please contact the clinic through MyChart or phone. If you have a critical or abnormal lab result, we will notify you by phone as soon as possible.  Submit refill requests through [employer] or call your pharmacy and they will forward the refill  "request to us. Please allow 3 business days for your refill to be completed.          Additional Information About Your Visit        MyChart Information     [employer] gives you secure access to your electronic health record. If you see a primary care provider, you can also send messages to your care team and make appointments. If you have questions, please call your primary care clinic.  If you do not have a primary care provider, please call [phone] and they will assist you.        Care EveryWhere ID     This is your Care EveryWhere ID. This could be used by other organizations to access your [place] medical records  [id number]        Your Vitals Were     Pulse Temperature Height BMI (Body Mass Index)          72 98.3  F (36.8  C) (Oral) 5' 5.95\" (1.675 m) 23.21 kg/m2         Blood Pressure from Last 3 Encounters:   10/24/18 122/60   08/07/18 109/71   06/14/18 117/73    Weight from Last 3 Encounters:   10/24/18 143 lb 9.6 oz (65.1 kg)   08/07/18 144 lb (65.3 kg)   06/14/18 145 lb (65.8 kg)              We Performed the Following     Beta strep group A culture     Strep, Rapid Screen        Primary Care Provider Office Phone # Fax #    Monik Lew PA-C [phone]       [address] [facility] [address]        Equal Access to Services     KENDALL CASTELLON : Hadii aad ku hadasho Soomaali, waaxda luqadaha, qaybta kaalmada aderoverto, gianni coreas. So [place] [phone].    ATENCIÓN: Si habla español, tiene a parra disposición servicios gratuitos de asistencia lingüística. Zoila al [phone].    We comply with applicable federal civil rights laws and [place] laws. We do not discriminate on the basis of race, color, national origin, age, disability, sex, sexual orientation, or gender identity.            Thank you!     Thank you for choosing [facility]  for your care. Our goal is always to provide you with excellent care. Hearing back from " our patients is one way we can continue to improve our services. Please take a few minutes to complete the written survey that you may receive in the mail after your visit with us. Thank you!             Your Updated Medication List - Protect others around you: Learn how to safely use, store and throw away your medicines at www.disposemymeds.org.          This list is accurate as of 10/24/18  3:33 PM.  Always use your most recent med list.                   Brand Name Dispense Instructions for use Diagnosis    acyclovir 400 MG tablet    ZOVIRAX     Take 400 mg by mouth        DULOXETINE HCL PO      Take 20 mg by mouth daily Patient takes 60MG in the AM and 40MG in the PM        etonogestrel 68 MG Impl    IMPLANON/NEXPLANON     1 each by Subdermal route once        LAMOTRIGINE PO      Take 200 mg by mouth daily        LORazepam 0.5 MG tablet    ATIVAN     Take 0.5 mg by mouth        norethindrone-ethinyl estradiol 1-20 MG-MCG per tablet    JUNEL FE 1/20    84 tablet    Take 1 tablet by mouth daily    Nexplanon in place, Vagina bleeding       QUETIAPINE FUMARATE PO      Take 25 mg by mouth as needed

## 2018-10-25 LAB
BACTERIA SPEC CULT: NORMAL
SPECIMEN SOURCE: NORMAL

## 2018-11-07 ENCOUNTER — OFFICE VISIT (OUTPATIENT)
Dept: PODIATRY | Facility: CLINIC | Age: 33
End: 2018-11-07
Payer: COMMERCIAL

## 2018-11-07 VITALS
BODY MASS INDEX: 22.98 KG/M2 | SYSTOLIC BLOOD PRESSURE: 110 MMHG | HEIGHT: 66 IN | DIASTOLIC BLOOD PRESSURE: 73 MMHG | HEART RATE: 62 BPM | WEIGHT: 143 LBS

## 2018-11-07 DIAGNOSIS — M21.619 BUNION: Primary | ICD-10-CM

## 2018-11-07 PROCEDURE — 99203 OFFICE O/P NEW LOW 30 MIN: CPT | Performed by: PODIATRIST

## 2018-11-07 ASSESSMENT — PAIN SCALES - GENERAL: PAINLEVEL: MODERATE PAIN (4)

## 2018-11-07 NOTE — MR AVS SNAPSHOT
"              After Visit Summary   11/7/2018    Alexandra Ruiz    MRN: [id number]           Patient Information     Date Of Birth          1985        Visit Information        Provider Department      11/7/2018 8:45 AM Osorio Mcneill DPM [facility]        Today's Diagnoses     Bunion    -  1      Care Instructions    Thank you for choosing [facility]!    Follow up as needed     DR. MCNEILL'S CLINIC LOCATIONS     MONDAY  [place] TUESDAY & FRIDAY AM  [place]   [address] [facility]   [address]   [place] [address] [place] [address]   [phone]  [phone]  [phone]       WEDNESDAY  [place] SCHEDULE SURGERY: [phone]   [address] [facility] APPOINTMENTS: [phone]   [place] [address] BILLING QUESTIONS: [phone]   [phone]         Bunion (hallux abducto valgus)   A bunion is caused by muscle imbalance. The great toe is pulled toward the smaller toes. The metatarsal head is pushed outward creating a lump on the side of your foot. Imbalance is the result of foot structure and instability.   Bunions do not improve with time. They usually enlarge, however this is a fairly slow process. Shoes do not necessarily cause bunions, however, they can hasten development and definitely cause bunions to hurt.   Bunions often run in families. We inherit a certain foot structure, which may be predisposed to bunion development.   Bunion pain is likely a combination of shoes rubbing on the bump, nerve irritation, compression between the toes, joint misalignment, arthritis and altered gait.   Signs & Symptoms of \"Bunions\"   Bunions are usually termed mild, moderate or severe. Just because you have a bunion does not mean you have to have pain. There are some people with very severe bunions and no pain and people with mild bunions and a lot of pain.    1.  Pain on the inside of your foot " "at the big toe joint (1st MTPJ)    2.  Swelling on the inside of your foot at the big toe joint    3.  Redness on the inside of your foot at the big toe joint    4.  Numbness or burning in the big toe (hallux)    5.  Decreased motion at the big toe joint    6.  Painful bursa (fluid-filled sac) on the inside of your foot at the big toe joint    7.  Pain while wearing shoes -especially shoes too narrow or with high heels     8.  Pain during activities    9.  Corn in between the big toe and second toe    10.  Callous formation on the side or bottom of the big toe or big toe joint    11.  Callous under the second toe joint (2nd MTPJ)    12.  Pain in the second toe joint   Treatment for \"Bunions\"   Conservative (non-surgical) treatment will not make the bunion go away, but it will hopefully decrease the signs and symptoms you have and help you get rid of the pain and get you back to your activities.    1.  Wider shoes    2.  Extra depth shoes    3.  Stretch shoes (where bunion is) Cut an \"x\" or cross in the shoe (where bunion is)    4.  Ice    5.  Padding, splints, toe spacers    6.  NSAIDs    7.  Arch supports    8.  Custom orthoses (orthotics)    9.  Change activities    10.  Physical therapy     11.  Surgical treatment for bunions is sometimes needed. If you are limited by pain, cannot fit in shoes comfortably and are not able to do your daily activities then surgery may be a good option for you. There are many different surgical procedures to repair bunions. Your foot doctor (podiatrist) will review your foot exam findings, your x-rays, your age, your health, your lifestyle, your physical activity level and discuss with you which procedure he or she would recommend. Surgical procedures for bunions range from soft tissue repair to cutting and realigning the bones. It is not recommended that you have bunion surgery for cosmetic reasons (you do not like how your foot looks) or because you want to fit in a certain pair " of shoes; There is the risk that even after surgery, the bunion will reoccur 9-10% of the time.   Most bunion pain can be improved simply by wearing compatible shoes. People with bunions cannot choose footwear simply because they like the style. Your bunion should determine which shoes are to be worn. Wide shoes with nonirritating seams,soft leather and a square toe box are most compatible with a bunion. Shoes should fit appropriately right out of the box but may need to be professionally stretched and modified to accommodate the bump. Heels, dress shoes and shoes with pointed toes will not be comfortable.   Shoe inserts or orthotics will often times help with bunion pain, however inserts make shoe fit more challenging. Pads placed over the bunion can also help relieve the pain. Injection may help with nerve irritation.   Bunion surgery involves cutting and repositioning the bones surrounding the bunion. Pins and screws are used to hold the bones in place during the healing process. The goal of bunion surgery is to reduce the size of the bunion bump. Realignment of the toe and joint is attempted.     Some first toes cannot be forced back into normal alignment even with surgery. Surgery is helpful in most cases but does not necessarily create a normal foot.   Healing after surgery requires about six weeks of protection. This allows the bone to heal. Maximum recovery takes about one year. The scar tissue and joint structures require this amount of time to finish the healing process. Expect stiffness, swelling and numbness during that time frame. Bunion surgery does involve side effects. Some side effects are predictable and others are less common but do occur. A scar will be visible and could be irritated by shoes. The shoe may rub on the screw or internal pin requiring surgical removal of these fixation devices. The screw and pin would likely be left in place for a full year. The first toe may loose motion after  bunion surgery. The amount of stiffness is variable. Some people never regain normal motion of the first toe. This is due to scar tissue inherent to any surgery. The first toe may drift toward the second toe or away from the second toe. Spreading of the first and second toes is a rare occurrence after bunion surgery. This can be quite bothersome and would need to be surgically repaired. Toe drift toward the second toe could result in a recurrent bunion and revision surgery. Joint fusion is one option to correct an unstable, drifting toe. This procedure straightens the toe, however, no motion remains. Fusion may be necessary to correct complications of bunion surgery or as the original procedure in severe cases.   All surgical procedures involve risk of infection, numbness, pain, delayed healing, osteotomy dislocation, blood clots, continued foot pain, etc. Bunion surgery is quite complex and should not be taken lightly.   Any skin incision can lead to infection. Deep infection might involve the bone and thus repeat surgery and six weeks of IV antibiotics. Scar tissue can cause nerve pain or numbness. This is generally temporary but can be permanent. We do not have treatments that cure nerve problems. Second toe pain could be related to altered mechanics and pressure transferred to the second toe. Most feet with bunions have pre-existing second toe problems. Delayed bone healing would lengthen the healing time. Some bones simply do not heal. This requires repeat surgery, electronic bone stimulation and/or extended protection. Smokers have an approximate 20% chance of poor bone healing. This is double that of a non-smoker. The bone cut may displace. This may need to be repaired with a second operation. Displacement can cause joint malalignment. Immobility after surgery can cause blood clots in the legs and lungs. This could result in death.   Foot pain is complex. Most feet hurt for more than one reason. Fixing the  "bunion would not necessarily create a pain free foot. Appropriate shoes, healthy body weight, avoidance of bare foot walking and moderation of activity will always be necessary to enjoy foot comfort. Your bunion may involve arthritis, which is incurable even with surgery. Long standing bunions often involve chronic irritation to the surrounding nerves. Nerve pain may not resolve even with reducing the bunion bump since permanent nerve damage may be present   Bunion surgery is nevertheless quite successful. Most surgical patients are pleased with their foot following bunion surgery. Many of the issues described above can be controlled by taking proper care of your foot during the healing process.   Cosmetic bunion surgery is discouraged for the reasons listed above. A bunion that is comfortable when wearing appropriate shoes should simply be treated with appropriate shoes.   Your surgeon would be happy to fully describe any of the above issues. You should pursue a full understanding of the operation,recovery process and any potential problems that could develop.   Prevention of \"Bunions\"    1.  Do not wear high heels if there is a family history of bunions.   2.  Wear shoes that have enough width and depth in the toe box.  Use a motion control arch support if you over-pronate (foot rolls in)     OVER THE COUNTER INSERTS    SuperFeet   Sofsole Fit Spenco   Power Step   Walk-Fit Arch Cradles     Most of these can be found at your local [employer], [employer], sporting [employer], or online:    1.  [email or web address].Vidavee/en-us/  2.  [email or web address]  3.  Https://[email or web address]/    **A good high quality over the counter insert should cost around $40-$50                        Follow-ups after your visit        Follow-up notes from your care team     Return if symptoms worsen or fail to improve.      Who to contact     If you have questions or need follow up information about today's clinic " "visit or your schedule please contact [facility] directly at [phone].  Normal or non-critical lab and imaging results will be communicated to you by MyChart, letter or phone within 4 business days after the clinic has received the results. If you do not hear from us within 7 days, please contact the clinic through [employer] or phone. If you have a critical or abnormal lab result, we will notify you by phone as soon as possible.  Submit refill requests through [employer] or call your pharmacy and they will forward the refill request to us. Please allow 3 business days for your refill to be completed.          Additional Information About Your Visit        [employer] Information     [employer] gives you secure access to your electronic health record. If you see a primary care provider, you can also send messages to your care team and make appointments. If you have questions, please call your primary care clinic.  If you do not have a primary care provider, please call [phone] and they will assist you.        Care EveryWhere ID     This is your Care EveryWhere ID. This could be used by other organizations to access your [place] medical records  [id number]        Your Vitals Were     Pulse Height BMI (Body Mass Index)             62 5' 5.95\" (1.675 m) 23.12 kg/m2          Blood Pressure from Last 3 Encounters:   11/07/18 110/73   10/24/18 122/60   08/07/18 109/71    Weight from Last 3 Encounters:   11/07/18 143 lb (64.9 kg)   10/24/18 143 lb 9.6 oz (65.1 kg)   08/07/18 144 lb (65.3 kg)              Today, you had the following     No orders found for display       Primary Care Provider Office Phone # Fax #    Monik Lew PA-C [phone] [address] [address]        Equal Access to Services     KENDALL CASTELLON : Jonathan hogano Soomaali, waaxda luqadaha, qaybta kaalmada adeegyada, gianni coreas. So wa " [phone].    ATENCIÓN: Si ying montiel, tiene a parra disposición servicios gratuitos de asistencia lingüística. Zoila terrazas [phone].    We comply with applicable federal civil rights laws and [place] laws. We do not discriminate on the basis of race, color, national origin, age, disability, sex, sexual orientation, or gender identity.            Thank you!     Thank you for choosing [facility]  for your care. Our goal is always to provide you with excellent care. Hearing back from our patients is one way we can continue to improve our services. Please take a few minutes to complete the written survey that you may receive in the mail after your visit with us. Thank you!             Your Updated Medication List - Protect others around you: Learn how to safely use, store and throw away your medicines at www.disposemymeds.org.          This list is accurate as of 11/7/18  9:15 AM.  Always use your most recent med list.                   Brand Name Dispense Instructions for use Diagnosis    acyclovir 400 MG tablet    ZOVIRAX     Take 400 mg by mouth        DULOXETINE HCL PO      Take 20 mg by mouth daily Patient takes 60MG in the AM and 40MG in the PM        etonogestrel 68 MG Impl    IMPLANON/NEXPLANON     1 each by Subdermal route once        LAMOTRIGINE PO      Take 200 mg by mouth daily        LORazepam 0.5 MG tablet    ATIVAN     Take 0.5 mg by mouth        norethindrone-ethinyl estradiol 1-20 MG-MCG per tablet    JUNEL FE 1/20    84 tablet    Take 1 tablet by mouth daily    Nexplanon in place, Vagina bleeding       QUETIAPINE FUMARATE PO      Take 25 mg by mouth as needed
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
## 2018-11-07 NOTE — PATIENT INSTRUCTIONS
"Thank you for choosing [facility]!    Follow up as needed     DR. MCNEILL'S CLINIC LOCATIONS     MONDAY  [place] TUESDAY & FRIDAY AM  [place]   [address]   [address]   [place] [address] [place] Coulter [address]   [phone]  [phone]  [phone]       [facility]  [facility] [phone]   [address] [facility] APPOINTMENTS: [phone]   [place] [address] BILLING QUESTIONS: [phone]   [phone]         Bunion (hallux abducto valgus)   A bunion is caused by muscle imbalance. The great toe is pulled toward the smaller toes. The metatarsal head is pushed outward creating a lump on the side of your foot. Imbalance is the result of foot structure and instability.   Bunions do not improve with time. They usually enlarge, however this is a fairly slow process. Shoes do not necessarily cause bunions, however, they can hasten development and definitely cause bunions to hurt.   Bunions often run in families. We inherit a certain foot structure, which may be predisposed to bunion development.   Bunion pain is likely a combination of shoes rubbing on the bump, nerve irritation, compression between the toes, joint misalignment, arthritis and altered gait.   Signs & Symptoms of \"Bunions\"   Bunions are usually termed mild, moderate or severe. Just because you have a bunion does not mean you have to have pain. There are some people with very severe bunions and no pain and people with mild bunions and a lot of pain.    1.  Pain on the inside of your foot at the big toe joint (1st MTPJ)    2.  Swelling on the inside of your foot at the big toe joint    3.  Redness on the inside of your foot at the big toe joint    4.  Numbness or burning in the big toe (hallux)    5.  Decreased motion at the big toe joint    6.  Painful bursa (fluid-filled sac) on the inside of your foot at the big toe joint    7.  Pain " "while wearing shoes -especially shoes too narrow or with high heels     8.  Pain during activities    9.  Corn in between the big toe and second toe    10.  Callous formation on the side or bottom of the big toe or big toe joint    11.  Callous under the second toe joint (2nd MTPJ)    12.  Pain in the second toe joint   Treatment for \"Bunions\"   Conservative (non-surgical) treatment will not make the bunion go away, but it will hopefully decrease the signs and symptoms you have and help you get rid of the pain and get you back to your activities.    1.  Wider shoes    2.  Extra depth shoes    3.  Stretch shoes (where bunion is) Cut an \"x\" or cross in the shoe (where bunion is)    4.  Ice    5.  Padding, splints, toe spacers    6.  NSAIDs    7.  Arch supports    8.  Custom orthoses (orthotics)    9.  Change activities    10.  Physical therapy     11.  Surgical treatment for bunions is sometimes needed. If you are limited by pain, cannot fit in shoes comfortably and are not able to do your daily activities then surgery may be a good option for you. There are many different surgical procedures to repair bunions. Your foot doctor (podiatrist) will review your foot exam findings, your x-rays, your age, your health, your lifestyle, your physical activity level and discuss with you which procedure he or she would recommend. Surgical procedures for bunions range from soft tissue repair to cutting and realigning the bones. It is not recommended that you have bunion surgery for cosmetic reasons (you do not like how your foot looks) or because you want to fit in a certain pair of shoes; There is the risk that even after surgery, the bunion will reoccur 9-10% of the time.   Most bunion pain can be improved simply by wearing compatible shoes. People with bunions cannot choose footwear simply because they like the style. Your bunion should determine which shoes are to be worn. Wide shoes with nonirritating seams,soft leather and " a square toe box are most compatible with a bunion. Shoes should fit appropriately right out of the box but may need to be professionally stretched and modified to accommodate the bump. Heels, dress shoes and shoes with pointed toes will not be comfortable.   Shoe inserts or orthotics will often times help with bunion pain, however inserts make shoe fit more challenging. Pads placed over the bunion can also help relieve the pain. Injection may help with nerve irritation.   Bunion surgery involves cutting and repositioning the bones surrounding the bunion. Pins and screws are used to hold the bones in place during the healing process. The goal of bunion surgery is to reduce the size of the bunion bump. Realignment of the toe and joint is attempted.     Some first toes cannot be forced back into normal alignment even with surgery. Surgery is helpful in most cases but does not necessarily create a normal foot.   Healing after surgery requires about six weeks of protection. This allows the bone to heal. Maximum recovery takes about one year. The scar tissue and joint structures require this amount of time to finish the healing process. Expect stiffness, swelling and numbness during that time frame. Bunion surgery does involve side effects. Some side effects are predictable and others are less common but do occur. A scar will be visible and could be irritated by shoes. The shoe may rub on the screw or internal pin requiring surgical removal of these fixation devices. The screw and pin would likely be left in place for a full year. The first toe may loose motion after bunion surgery. The amount of stiffness is variable. Some people never regain normal motion of the first toe. This is due to scar tissue inherent to any surgery. The first toe may drift toward the second toe or away from the second toe. Spreading of the first and second toes is a rare occurrence after bunion surgery. This can be quite bothersome and would  need to be surgically repaired. Toe drift toward the second toe could result in a recurrent bunion and revision surgery. Joint fusion is one option to correct an unstable, drifting toe. This procedure straightens the toe, however, no motion remains. Fusion may be necessary to correct complications of bunion surgery or as the original procedure in severe cases.   All surgical procedures involve risk of infection, numbness, pain, delayed healing, osteotomy dislocation, blood clots, continued foot pain, etc. Bunion surgery is quite complex and should not be taken lightly.   Any skin incision can lead to infection. Deep infection might involve the bone and thus repeat surgery and six weeks of IV antibiotics. Scar tissue can cause nerve pain or numbness. This is generally temporary but can be permanent. We do not have treatments that cure nerve problems. Second toe pain could be related to altered mechanics and pressure transferred to the second toe. Most feet with bunions have pre-existing second toe problems. Delayed bone healing would lengthen the healing time. Some bones simply do not heal. This requires repeat surgery, electronic bone stimulation and/or extended protection. Smokers have an approximate 20% chance of poor bone healing. This is double that of a non-smoker. The bone cut may displace. This may need to be repaired with a second operation. Displacement can cause joint malalignment. Immobility after surgery can cause blood clots in the legs and lungs. This could result in death.   Foot pain is complex. Most feet hurt for more than one reason. Fixing the bunion would not necessarily create a pain free foot. Appropriate shoes, healthy body weight, avoidance of bare foot walking and moderation of activity will always be necessary to enjoy foot comfort. Your bunion may involve arthritis, which is incurable even with surgery. Long standing bunions often involve chronic irritation to the surrounding nerves. Nerve  "pain may not resolve even with reducing the bunion bump since permanent nerve damage may be present   Bunion surgery is nevertheless quite successful. Most surgical patients are pleased with their foot following bunion surgery. Many of the issues described above can be controlled by taking proper care of your foot during the healing process.   Cosmetic bunion surgery is discouraged for the reasons listed above. A bunion that is comfortable when wearing appropriate shoes should simply be treated with appropriate shoes.   Your surgeon would be happy to fully describe any of the above issues. You should pursue a full understanding of the operation,recovery process and any potential problems that could develop.   Prevention of \"Bunions\"    1.  Do not wear high heels if there is a family history of bunions.   2.  Wear shoes that have enough width and depth in the toe box.  Use a motion control arch support if you over-pronate (foot rolls in)     OVER THE COUNTER INSERTS    SuperFeet   Sofsole Fit Spenco   Power Step   Walk-Fit Arch Cradles     Most of these can be found at your local [employer], [employer], or online:    1.  [email or web address]/en-us/  2.  Https://[employer]/  3.  Https://www.[email or web address]    **A good high quality over the counter insert should cost around $40-$50                "
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
## 2019-01-09 ENCOUNTER — OFFICE VISIT (OUTPATIENT)
Dept: FAMILY MEDICINE | Facility: CLINIC | Age: 34
End: 2019-01-09
Payer: COMMERCIAL

## 2019-01-09 VITALS
WEIGHT: 143 LBS | TEMPERATURE: 98.2 F | BODY MASS INDEX: 22.98 KG/M2 | HEART RATE: 90 BPM | SYSTOLIC BLOOD PRESSURE: 116 MMHG | HEIGHT: 66 IN | DIASTOLIC BLOOD PRESSURE: 75 MMHG

## 2019-01-09 DIAGNOSIS — G43.809 OTHER MIGRAINE WITHOUT STATUS MIGRAINOSUS, NOT INTRACTABLE: Primary | ICD-10-CM

## 2019-01-09 DIAGNOSIS — F31.70 BIPOLAR AFFECTIVE DISORDER IN REMISSION (H): ICD-10-CM

## 2019-01-09 PROCEDURE — 99214 OFFICE O/P EST MOD 30 MIN: CPT | Performed by: NURSE PRACTITIONER

## 2019-01-09 RX ORDER — RIZATRIPTAN BENZOATE 5 MG/1
5 TABLET ORAL
Qty: 30 TABLET | Refills: 0 | Status: SHIPPED | OUTPATIENT
Start: 2019-01-09 | End: 2019-04-01

## 2019-01-09 ASSESSMENT — PATIENT HEALTH QUESTIONNAIRE - PHQ9: SUM OF ALL RESPONSES TO PHQ QUESTIONS 1-9: 14

## 2019-01-09 ASSESSMENT — MIFFLIN-ST. JEOR: SCORE: 1376.42

## 2019-01-09 NOTE — PATIENT INSTRUCTIONS
[facility]   Discharged by : Celestino Cr CMA on 1/9/2019 at 10:26 AM    Paper scripts provided to patient :      If you have any questions regarding your visit please contact your care team:     Team Gold                Clinic Hours Telephone Number     Dr. Maya Nyenifer Olvin, CNP 7am-7pm  Monday - Thursday   7am-5pm  Fridays  [phone]   (Appointment scheduling available 24/7)     RN Line  [phone] option 2     Urgent Care - [place] - 11am-9pm Monday-Friday Saturday-Sunday- 9am-5pm     [place] -   5pm-9pm Monday-Friday Saturday-Sunday- 9am-5pm    [phone] - [place]    [phone] - [place]     For a Price Quote for your services, please call our [employer] Price Line at [phone].     What options do I have for visits at the clinic other than the traditional office visit?     To expand how we care for you, many of our providers are utilizing electronic visits (e-visits) and telephone visits, when medically appropriate, for interactions with their patients rather than a visit in the clinic. We also offer nurse visits for many medical concerns. Just like any other service, we will bill your insurance company for this type of visit based on time spent on the phone with your provider. Not all insurance companies cover these visits. Please check with your medical insurance if this type of visit is covered. You will be responsible for any charges that are not paid by your insurance.   E-visits via [employer]: generally incur a $35.00 fee.     Telephone visits:  Time spent on the phone: *charged based on time that is spent on the phone in increments of 10 minutes. Estimated cost:   5-10 mins $30.00   11-20 mins. $59.00   21-30 mins. $85.00     Use [employer] (secure email communication and access to your chart) to send your primary care provider a message or make an appointment. Ask someone  on your Team how to sign up for [employer].     As always, Thank you for trusting us with your health care needs!    [facility]:    Scheduling Appointments  [place]  Call: [phone]    [facility]  Call: [phone]    [facility]  Call: [phone]    For Gastroenterology referrals   [facility]   Clinics and Surgery [place], 4th Floor   [address] [place] [address]   Appointments: [phone]    WHERE TO GO FOR CARE?  Clinic    Make an appointment if you:       Are sick (cold, cough, flu, sore throat, earache or in pain).       Have a small injury (sprain, small cut, burn or broken bone).       Need a physical exam, Pap smear, vaccine or prescription refill.       Have questions about your health or medicines.    To reach us:      Call [phone] ([phone]). Open 24 hours every day. (For counseling services, call [phone].)    Log into [employer] at [email or web address]. (Visit [email or web address] to create an account.) Hospital emergency room    An emergency is a serious or life- threatening problem that must be treated right away.    Call 686 or get to the hospital if you have:      Very bad or sudden:            - Chest pain or pressure         - Bleeding         - Head or belly pain         - Dizziness or trouble seeing, walking or                          Speaking      Problems breathing      Blood in your vomit or you are coughing up blood      A major injury (knocked out, loss of a finger or limb, rape, broken bone protruding from skin)    A mental health crisis. (Or call the Mental Health Crisis line at 1-949.575.1035 or Suicide Prevention Hotline at 1-866.558.9091.)    Open 24 hours every day. You don't need an appointment.     Urgent care    Visit urgent care for sickness or small injuries when the clinic is closed. You don't need an appointment. To check hours or  find an urgent care near you, visit [email or web address]. Online care    Get online care from [facility] for more than 70 common problems, like colds, allergies and infections. Open 24 hours every day at:   [email or web address]   Need help deciding?    For advice about where to be seen, you may call your clinic and ask to speak with a nurse. We're here for you 24 hours every day.         If you are deaf or hard of hearing, please let us know. We provide many free services including sign language interpreters, oral interpreters, TTYs, telephone amplifiers, note takers and written materials.

## 2019-01-09 NOTE — PROGRESS NOTES
SUBJECTIVE:   Alexandra Ruiz is a 33 year old female who presents to clinic today for the following health issues:      Headache  Onset: for the past month    Description:   Location: unilateral in the right temporal area   Character: throbbing pain  Frequency: daily  Duration:  About one hour with medication     Intensity: moderate, severe    Progression of Symptoms:  intermittent    Accompanying Signs & Symptoms:  Stiff neck: no  Neck or upper back pain: no  Fever: no  Sinus pressure: no  Nausea or vomiting: YES- with migraines   Dizziness: no  Numbness: no  Weakness: no  Visual changes: no     History:   Head trauma: no  Family history of migraines: no  Previous tests for headaches: no  Neurologist evaluations: no  Able to do daily activities: no  Wake with a headaches: YES  Do headaches wake you up: says that she's had bad migraines at night. Not awaken from sleep   Daily pain medication use: YES- since headaches begun one month ago  Work/school stressors/changes: no    Precipitating factors:   Does light make it worse: YES  Does sound make it worse: YES    Alleviating factors:  Does sleep help: no    Therapies Tried and outcome: Ibuprofen (Advil, Motrin) and Excedrin    New issue says that she has never really had migraines in the past. Unrelated to cycles. Started about a month ago. Hasn't been more stressed lately. Works as PCA during the day. Still able to do ADL's and go to work with headaches. No neck pain, no recent illness or fevers. Says that she has felt nauseous a few times with the headache when it's very bad. Worse with light. Headaches last about an hour and resolved with Excedrin migraine. Headaches come at predictable time of morning then subside with medication.     Bipolar disorder  Currently stable. On lamictal, seroquel and duloxetine. PRN ativan.   Followed by Pardeep.     PCP TORRIE Lew  Problem list and histories reviewed & adjusted, as indicated.  Additional history: as  documented    Patient Active Problem List   Diagnosis     Moderate major depression (H)     CARDIOVASCULAR SCREENING; LDL GOAL LESS THAN 160     Acrodermatitis enteropathica     ASCUS of cervix with negative high risk HPV     Past Surgical History:   Procedure Laterality Date      SECTION  2012    Procedure:  SECTION;;  Surgeon: Aleksandra Lin MD;  Location:  [facility]       Social History     Tobacco Use     Smoking status: Former Smoker     Packs/day: 0.50     Years: 9.00     Pack years: 4.50     Types: Cigarettes     Last attempt to quit: 2012     Years since quittin.9     Smokeless tobacco: Never Used   Substance Use Topics     Alcohol use: No     Family History   Problem Relation Age of Onset     Cerebrovascular Disease Maternal Grandfather      C.A.D. Maternal Grandfather      Musculoskeletal Disorder Mother 47        MS     Thyroid Cancer Father      Cancer Paternal Grandfather          Current Outpatient Medications   Medication Sig Dispense Refill     DULOXETINE HCL PO Take 20 mg by mouth daily Patient takes 60MG in the AM and 40MG in the PM       etonogestrel (IMPLANON/NEXPLANON) 68 MG IMPL 1 each by Subdermal route once       LAMOTRIGINE PO Take 200 mg by mouth daily       LORazepam (ATIVAN) 0.5 MG tablet Take 0.5 mg by mouth       norethindrone-ethinyl estradiol (JUNEL ) 1-20 MG-MCG per tablet Take 1 tablet by mouth daily 84 tablet 0     QUETIAPINE FUMARATE PO Take 25 mg by mouth as needed       rizatriptan (MAXALT) 5 MG tablet Take 1 tablet (5 mg) by mouth at onset of headache for migraine 30 tablet 0     acyclovir (ZOVIRAX) 400 MG tablet Take 400 mg by mouth         Reviewed and updated as needed this visit by clinical staff  Tobacco  Allergies  Meds  Med Hx  Surg Hx  Fam Hx  Soc Hx      Reviewed and updated as needed this visit by Provider         ROS:  Constitutional, HEENT, cardiovascular, pulmonary, GI, , musculoskeletal, neuro, skin, endocrine and  "psych systems are negative, except as otherwise noted.    OBJECTIVE:     /75   Pulse 90   Temp 98.2  F (36.8  C) (Oral)   Ht 1.67 m (5' 5.75\")   Wt 64.9 kg (143 lb)   LMP 12/26/2018 (Exact Date)   Breastfeeding? No   BMI 23.26 kg/m    Body mass index is 23.26 kg/m .   GENERAL: healthy, alert and no distress  EYES: Eyes grossly normal to inspection, PERRL and conjunctivae and sclerae normal  NECK: no adenopathy, no asymmetry, masses, or scars and thyroid normal to palpation  RESP: lungs clear to auscultation - no rales, rhonchi or wheezes  CV: regular rate and rhythm, normal S1 S2, no S3 or S4, no murmur, click or rub, no peripheral edema and peripheral pulses strong  ABDOMEN: soft, nontender, no hepatosplenomegaly, no masses and bowel sounds normal  MS: no gross musculoskeletal defects noted, no edema  SKIN: no suspicious lesions or rashes  NEURO: sensory exam grossly normal, light touch and pinprick normal, mentation intact, cranial nerves 2-12 intact, DTR's normal and symmetric +2 upper and lower, gait normal including heel/toe/tandem walking, Romberg normal and rapid alternating movements normal  PSYCH: mentation appears normal, affect normal/bright  LYMPH: no cervical, supraclavicular, axillary, or inguinal adenopathy    Diagnostic Test Results:  none       PLAN:   (G43.933) Other migraine without status migrainosus, not intractable  (primary encounter diagnosis)  Comment: new onset of headaches, predictable pattern in the morning that resolves with Excedrin migraine. Her neurological exam is normal. There are no concerning signs from history that would warrant immediate imaging.   Plan: Continue with Excedrin migraine as first line therapy, if fails try rizatriptan (MAXALT) 5 MG tablet  Advised her that if her symptoms worsen or new symptoms develop to please call the clinic and I will order Head CT. She agreed and understood plan.     (F31.70) Bipolar affective disorder in remission (H)  Comment: " stable   Plan: Continue current medication plan         GISELL Faria CNP  [facility]

## 2019-01-12 ENCOUNTER — NURSE TRIAGE (OUTPATIENT)
Dept: NURSING | Facility: CLINIC | Age: 34
End: 2019-01-12

## 2019-01-12 NOTE — TELEPHONE ENCOUNTER
Alexandra is the third person in her home, the first two were children, with skin lumps/infections in the past week or so.  The kid's lesions opened and drained and they feel fine now.  Alexandra's lesion is getting bigger and is significantly more painful than when she first noticed it earlier this morning.  It's just inside her right butt cheek about an inch down from the lowest part of her back. She doesn't have a thermometer but thinks she has a low grade fever.  I instructed she be seen within four hours.  She stated understanding and agreement. She'll go in to an urgent care.      Reason for Disposition    SEVERE pain (e.g., excruciating)    Additional Information    Negative: Sounds like a life-threatening emergency to the triager    Negative: Patient sounds very sick or weak to the triager    Protocols used: SKIN LUMP OR LOCALIZED SWELLING-ADULT-  Renata PEREZ RN [facility]

## 2019-04-01 ENCOUNTER — OFFICE VISIT (OUTPATIENT)
Dept: FAMILY MEDICINE | Facility: CLINIC | Age: 34
End: 2019-04-01
Payer: COMMERCIAL

## 2019-04-01 VITALS
DIASTOLIC BLOOD PRESSURE: 68 MMHG | TEMPERATURE: 98.1 F | HEART RATE: 72 BPM | HEIGHT: 66 IN | SYSTOLIC BLOOD PRESSURE: 118 MMHG | BODY MASS INDEX: 23.37 KG/M2 | WEIGHT: 145.4 LBS

## 2019-04-01 DIAGNOSIS — R41.3 MEMORY CHANGES: Primary | ICD-10-CM

## 2019-04-01 DIAGNOSIS — G43.809 OTHER MIGRAINE WITHOUT STATUS MIGRAINOSUS, NOT INTRACTABLE: ICD-10-CM

## 2019-04-01 PROCEDURE — 99214 OFFICE O/P EST MOD 30 MIN: CPT | Performed by: FAMILY MEDICINE

## 2019-04-01 ASSESSMENT — MIFFLIN-ST. JEOR: SCORE: 1387.31

## 2019-04-01 NOTE — PROGRESS NOTES
SUBJECTIVE:   Alexandra Ruiz is a 33 year old female who presents to clinic today for the following health issues:    Concern - Memory Issues  Onset: 1-2 months    Description:   History of headaches/migraines-- getting better but she is noticing that she is having memory issues- tells the same stories over and over-- mom and boyfriend are noticing. Issues with short term memory    Intensity: mild    Progression of Symptoms:  same    Accompanying Signs & Symptoms:  None    Previous history of similar problem:   None    Precipitating factors:   Worsened by: -- maybe stress?     Alleviating factors:  Improved by: None    Therapies Tried and outcome: None    Patient started to have migraines about 2 months ago and was having them frequently (seemingly out of nowhere).  Improved on it's own, but pt has noticed memory changes since they stopped.  She has been repeating stories and not remembering at all that she had told the story before.  Forgetting discussions that she has previously.  No trouble with long term memory.  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  No new medications.  Family history of Alzheimers.  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  She wonders if her memory issues are due to past drug use.      Problem list and histories reviewed & adjusted, as indicated.  Additional history: as documented    Patient Active Problem List   Diagnosis     Moderate major depression (H)     CARDIOVASCULAR SCREENING; LDL GOAL LESS THAN 160     Acrodermatitis enteropathica     ASCUS of cervix with negative high risk HPV     Bipolar affective disorder in remission (H)     Other migraine without status migrainosus, not intractable     Past Surgical History:   Procedure Laterality Date      SECTION  2012    Procedure:  SECTION;;  Surgeon: Aleksandra Lin MD;  Location:  [facility]       Social History     Tobacco Use      "Smoking status: Former Smoker     Packs/day: 0.50     Years: 9.00     Pack years: 4.50     Types: Cigarettes     Last attempt to quit: 2012     Years since quittin.2     Smokeless tobacco: Never Used   Substance Use Topics     Alcohol use: No     Family History   Problem Relation Age of Onset     Cerebrovascular Disease Maternal Grandfather      C.A.D. Maternal Grandfather      Musculoskeletal Disorder Mother 47        MS     Thyroid Cancer Father      Cancer Paternal Grandfather            Reviewed and updated as needed this visit by clinical staff  Tobacco  Allergies  Meds  Med Hx  Surg Hx  Fam Hx  Soc Hx      Reviewed and updated as needed this visit by Provider         ROS:  Constitutional, HEENT, cardiovascular, pulmonary, gi and gu systems are negative, except as otherwise noted.    OBJECTIVE:     /68 (BP Location: Right arm, Patient Position: Sitting, Cuff Size: Adult Regular)   Pulse 72   Temp 98.1  F (36.7  C) (Oral)   Ht 1.67 m (5' 5.75\")   Wt 66 kg (145 lb 6.4 oz)   LMP 2019   BMI 23.65 kg/m    Body mass index is 23.65 kg/m .  GENERAL: healthy, alert and no distress  EYES: Eyes grossly normal to inspection, PERRL and conjunctivae and sclerae normal  HENT: ear canals and TM's normal, nose and mouth without ulcers or lesions  NECK: no adenopathy, no asymmetry, masses, or scars and thyroid normal to palpation  RESP: lungs clear to auscultation - no rales, rhonchi or wheezes  CV: regular rates and rhythm, normal S1 S2, no S3 or S4 and no murmur, click or rub  MS: no gross musculoskeletal defects noted, no edema  NEURO: Normal strength and tone, mentation intact, speech normal, cranial nerves 2-12 intact and DTR's normal and symmetric bilateral UE and LE    ASSESSMENT/PLAN:       ICD-10-CM    1. Memory changes R41.3 NEUROPSYCHOLOGY REFERRAL   2. Other migraine without status migrainosus, not intractable G43.809 NEUROPSYCHOLOGY REFERRAL     Difficulty with short term memory that " started after a 1-2 month bout of new onset recurrent migraines.  HAs now resolved.  Neuro exam normal today, so did not see a need for imaging at this point.  Will refer for neuropsych testing and for recommendations for any further evaluation.      Follow up in 4-6 weeks     Dennise Khan DO  [facility]

## 2019-04-27 ENCOUNTER — HOSPITAL ENCOUNTER (EMERGENCY)
Facility: CLINIC | Age: 34
Discharge: HOME | End: 2019-04-27
Attending: PHYSICIAN ASSISTANT | Admitting: PHYSICIAN ASSISTANT
Payer: COMMERCIAL

## 2019-04-27 VITALS
HEART RATE: 92 BPM | SYSTOLIC BLOOD PRESSURE: 115 MMHG | TEMPERATURE: 98.4 F | WEIGHT: 135 LBS | DIASTOLIC BLOOD PRESSURE: 73 MMHG | BODY MASS INDEX: 21.96 KG/M2 | RESPIRATION RATE: 16 BRPM | OXYGEN SATURATION: 100 %

## 2019-04-27 DIAGNOSIS — L03.114 CELLULITIS OF LEFT ARM: ICD-10-CM

## 2019-04-27 DIAGNOSIS — S09.90XA CLOSED HEAD INJURY, INITIAL ENCOUNTER: ICD-10-CM

## 2019-04-27 PROCEDURE — 99283 EMERGENCY DEPT VISIT LOW MDM: CPT

## 2019-04-27 PROCEDURE — 25000132 ZZH RX MED GY IP 250 OP 250 PS 637: Performed by: PHYSICIAN ASSISTANT

## 2019-04-27 RX ORDER — ACETAMINOPHEN 500 MG
1000 TABLET ORAL ONCE
Status: COMPLETED | OUTPATIENT
Start: 2019-04-27 | End: 2019-04-27

## 2019-04-27 RX ORDER — DOXYCYCLINE 100 MG/1
100 CAPSULE ORAL 2 TIMES DAILY
Qty: 20 CAPSULE | Refills: 0 | Status: SHIPPED | OUTPATIENT
Start: 2019-04-27 | End: 2019-06-18

## 2019-04-27 RX ADMIN — ACETAMINOPHEN 1000 MG: 500 TABLET, FILM COATED ORAL at 11:40

## 2019-04-27 ASSESSMENT — ENCOUNTER SYMPTOMS
MYALGIAS: 1
CHILLS: 0
WOUND: 0
HEADACHES: 1
FEVER: 0
COLOR CHANGE: 1

## 2019-04-27 NOTE — ED PROVIDER NOTES
History     Chief Complaint:  Wound Check    HPI   Alexandra Ruiz is a 33 year old female with a history of MRSA who presents for a wound check. The patient reports that she has a history of MRSA, and last was treated for cellulitis with doxycycline in January. Yesterday, she had a tattoo on her left forearm/elbow touched up, and since then, has had increasing pain and redness in the area. This morning, her car door additionally swung out in the wind, hitting her in the left temporal scalp. With increasing pain in both regions, she presented to the ED for evaluation. Here in the ED, she adds that she had no loss of consciousness with this head injury and has no wound to the area. She has no fever or other symptoms or concerns here today.     Allergies:  Ceclor  Penicillins    Medications:    Acyclovir  Duloxetine  Implanon  Lamotrigine  Ativan  Quetiapine    Past Medical History:    Acrodermatitis enteropathica  Anxiety  Depression  Migraine  MRSA  Bipolar disorder    Past Surgical History:     section    Family History:    MS  Thyroid cancer    Social History:  Marital Status:  Single [1]  Former Smoker  Negative for alcohol use.  Drug use: marijuana     Review of Systems   Constitutional: Negative for chills and fever.   Musculoskeletal: Positive for myalgias.   Skin: Positive for color change. Negative for wound.   Neurological: Positive for headaches.        Negative LOC   All other systems reviewed and are negative.      Physical Exam     Patient Vitals for the past 24 hrs:   BP Temp Temp src Pulse Resp SpO2 Weight   19 1127 115/73 98.4  F (36.9  C) Oral 92 16 100 % 61.2 kg (135 lb)     Physical Exam  Constitutional: well appearing, no acute distress.   Head: small abrasion and hematoma over left temporal scalp. No other head or facial trauma appreciated.   Eyes: Pupils are equal, round, and reactive to light. Conjunctiva normal. EOMI.  ENT: MMM. Oropharynx clear and moist. Normal voice.    Neck: non-tender with normal ROM.    Cardiovascular: Normal rate, regular rhythm, and intact distal pulses.    Respiratory: Effort normal. No respiratory distress. Lungs clear to auscultation bilaterally.   GI: Soft. There is no tenderness.   Musculoskeletal: Extremities are non-tender with normal ROM of all joints. No edema noted.   Neurological: Alert and Oriented x 3. Speech normal. Moves all extremities equally. CN II-XII intact. Coordination normal. Normal strength and sensation in extremities. Gait normal.   Psychiatric: Appropriate mood, affect, and behavior.   Skin: Skin is warm and dry. Arms covered with tattoos, but left antecubital fossa appears to have small erythematous papule with surrounding erythema and tenderness. No significant induration or fluctuance appreciated.          Emergency Department Course   Interventions:  1140 Tylenol 1000 mg PO    Emergency Department Course:  Nursing notes and vitals reviewed. (8809) I performed an exam of the patient as documented above.     Medicine administered as documented above.    Findings and plan explained to the Patient. Patient discharged home with instructions regarding supportive care, medications, and reasons to return. The importance of close follow-up was reviewed. The patient was prescribed doxycycline.     I personally answered all related questions prior to discharge.      Impression & Plan      Medical Decision Making:  Alexandra Ruiz is a 33 year old female who presents for evaluation of skin redness and pain.  The history, physical exam and supporting data are consistent with cellulitis.  There do not appear at this time to be any complication of cellulitis including necrotizing fascitis, osteomyelitis, sepsis, or shock. The patient is not immunosuppressed.  I do not believe there is an abscess amenable to incision and drainage. She does have a history of MRSA in the past and therefore I will plan to treat her with a course of doxycyline  for cellulitis. She is otherwise well appearing and afebrile and appropriate for outpatient management. She also reports hitting her head prior to arrival. No LOC. No vomiting. She has a normal neurologic exam. There is no indication for head CT at this time. We discussed concussions and I recommended ibuprofen and tylenol for pain. She was instructed to follow-up with her primary care provider in 2-3 days for wound check. Instructed to return to the ED for any new or worsening symptoms.       Diagnosis:    ICD-10-CM    1. Cellulitis of left arm L03.114    2. Closed head injury, initial encounter S09.90XA      Disposition:  discharged to home    Discharge Medications:     Medication List      Started    doxycycline hyclate 100 MG capsule  Commonly known as:  VIBRAMYCIN  100 mg, Oral, 2 TIMES DAILY          Scribe Disclosure:  Nicole IGLESIAS, am serving as a scribe on 4/27/2019 at 11:39 AM to personally document services performed by Reva Verduzco PA-C based on my observations and the provider's statements to me.     Nicole De La Torre  4/27/2019   [facility] EMERGENCY DEPARTMENT       Reva Verduzco PA-C  04/27/19 [id number]

## 2019-04-27 NOTE — ED AVS SNAPSHOT
[facility] Emergency Department  [address]  [facility] [place] [address]  Phone:  [phone]  Fax:  [phone]                                    Alexandra Ruiz   MRN: [id number]    Department:  [facility] Emergency Department   Date of Visit:  4/27/2019           After Visit Summary Signature Page    I have received my discharge instructions, and my questions have been answered. I have discussed any challenges I see with this plan with the nurse or doctor.    ..........................................................................................................................................  Patient/Patient Representative Signature      ..........................................................................................................................................  Patient Representative Print Name and Relationship to Patient    ..................................................               ................................................  Date                                   Time    ..........................................................................................................................................  Reviewed by Signature/Title    ...................................................              ..............................................  Date                                               Time          22EPIC Rev 08/18

## 2019-04-27 NOTE — ED TRIAGE NOTES
Pt has several wounds that are red, painful and crusty.  Has had MRSA recently.  Pt also hit head on car door prior to arrival and c/o pain in left side of head.

## 2019-04-27 NOTE — DISCHARGE INSTRUCTIONS
Discharge Instructions  Cellulitis    Cellulitis is an infection of the skin that occurs when bacteria enter the skin.   Symptoms are generally redness, swelling, warmth and pain.  Your infection appeared to be appropriate to treat at home with antibiotics.  However, sometimes your infection may be worse than it seemed at first, or may worsen with time. If you have new or worse symptoms, you may need to be seen again in the Emergency Department or by your primary provider.    Generally, every Emergency Department visit should have a follow-up clinic visit with either a primary or a specialty clinic/provider. Please follow-up as instructed by your emergency provider today.    Return to the Emergency Department if:  The redness, pain, or swelling gets a lot worse.  If the red area was marked, return if it is red significantly beyond the marked area.  You are unable to get your antibiotics, or are vomiting (throwing up) these pills, or you cannot take them.  You are feeling more ill, weak or lightheaded.  You start to run a new fever (temperature >101 F).  Anything else about the infection worries or concerns you.  Treatment:  Start your antibiotics right away, and take them as prescribed. Be sure to finish the whole prescription, even if you are better.  Apply a heating pad, warm packs, or warm water soaks to the infected area for 15 minutes at a time, at least 3 times a day. Do not use a heating pad on your feet or legs if you have diabetes. Do not sleep with a heating pad on, since this can cause burns or skin injury.  Rest your injured area for at least 1-2 days. After that you may start using your extremity again as long as there is not too much pain.   Raise the injured area above the level of your heart as much as possible in the first 1-2 days.  Tylenol  (acetaminophen), Motrin  (ibuprofen), or Advil  (ibuprofen) may help may help reduce pain and fever and may help you feel more comfortable. Be sure to read and  follow the package directions, and ask your provider if you have questions.    If you were given a prescription for medicine here today, be sure to read all of the information (including the package insert) that comes with your prescription.  This will include important information about the medicine, its side effects, and any warnings that you need to know about.  The pharmacist who fills the prescription can provide more information and answer questions you may have about the medicine.  If you have questions or concerns that the pharmacist cannot address, please call or return to the Emergency Department.     Remember that you can always come back to the Emergency Department if you are not able to see your regular provider in the amount of time listed above, if you get any new symptoms, or if there is anything that worries you.    Discharge Instructions  Head Injury    You have been seen today for a head injury. Your evaluation included a history and physical examination. You may have had a CT (CAT) scan performed, though most head injuries do not require a scan. Based on this evaluation, your provider today does not feel that your head injury is serious.    Generally, every Emergency Department visit should have a follow-up clinic visit with either a primary or a specialty clinic/provider. Please follow-up as instructed by your emergency provider today.  Return to the Emergency Department if:  You are confused or you are not acting right.  Your headache gets worse or you start to have a really bad headache even with your recommended treatment plan.  You vomit (throw up) more than once.  You have a seizure.  You have trouble walking.  You have weakness or paralysis (cannot move) in an arm or a leg.  You have blood or fluid coming from your ears or nose.  You have new symptoms or anything that worries you.    Sleeping:  It is okay for you to sleep, but someone should wake you up if instructed by your provider, and  someone should check on you at your usual time to wake up.     Activity:  Do not drive for at least 24 hours.  Do not drive if you have dizzy spells or trouble concentrating, or remembering things.  Do not return to any contact sports until cleared by your regular provider.     MORE INFORMATION:    Concussion:  A concussion is a minor head injury that may cause temporary problems with the way the brain works. Although concussions are important, they are generally not an emergency or a reason that a person needs to be hospitalized. Some concussion symptoms include confusion, amnesia (forgetful), nausea (sick to your stomach) and vomiting (throwing up), dizziness, fatigue, memory or concentration problems, irritability and sleep problems. For most people, concussions are mild and temporary but some will have more severe and persistent symptoms that require on-going care and treatment.  CT Scans: Your evaluation today may have included a CT scan (CAT scan) to look for things like bleeding or a skull fracture (broken bone).  CT scans involve radiation and too many CT scans can cause serious health problems like cancer, especially in children.  Because of this, your provider may not have ordered a CT scan today if they think you are at low risk for a serious or life threatening problem.    If you were given a prescription for medicine here today, be sure to read all of the information (including the package insert) that comes with your prescription.  This will include important information about the medicine, its side effects, and any warnings that you need to know about.  The pharmacist who fills the prescription can provide more information and answer questions you may have about the medicine.  If you have questions or concerns that the pharmacist cannot address, please call or return to the Emergency Department.     Remember that you can always come back to the Emergency Department if you are not able to see your  regular provider in the amount of time listed above, if you get any new symptoms, or if there is anything that worries you.

## 2019-04-30 ENCOUNTER — OFFICE VISIT (OUTPATIENT)
Dept: FAMILY MEDICINE | Facility: CLINIC | Age: 34
End: 2019-04-30
Payer: COMMERCIAL

## 2019-04-30 VITALS
HEIGHT: 66 IN | DIASTOLIC BLOOD PRESSURE: 65 MMHG | WEIGHT: 148 LBS | SYSTOLIC BLOOD PRESSURE: 104 MMHG | OXYGEN SATURATION: 99 % | HEART RATE: 60 BPM | BODY MASS INDEX: 23.78 KG/M2 | TEMPERATURE: 98 F

## 2019-04-30 DIAGNOSIS — L03.114 CELLULITIS OF ARM, LEFT: Primary | ICD-10-CM

## 2019-04-30 PROCEDURE — 99214 OFFICE O/P EST MOD 30 MIN: CPT | Performed by: FAMILY MEDICINE

## 2019-04-30 RX ORDER — DOXYCYCLINE 100 MG/1
100 CAPSULE ORAL 2 TIMES DAILY
Qty: 20 CAPSULE | Refills: 0 | Status: SHIPPED | OUTPATIENT
Start: 2019-04-30 | End: 2019-06-18

## 2019-04-30 ASSESSMENT — MIFFLIN-ST. JEOR: SCORE: 1399.1

## 2019-04-30 NOTE — PROGRESS NOTES
SUBJECTIVE:   Alexandra Ruiz is a 33 year old female who presents to clinic today for the following   health issues:        ED/UC Followup:    Facility:  [facility]  Date of visit: 2019  Reason for visit: cellulitis of left arm  Current Status: not doing better, patient did not take the antibiotics that were prescribed at the ER        10 X 7 erythema square looks like dermattis  3.5 X 4 cm swelling   Amount of exercise or daily activities, outside of work:   Social History     Tobacco Use     Smoking status: Former Smoker     Packs/day: 0.50     Years: 9.00     Pack years: 4.50     Types: Cigarettes     Last attempt to quit: 2012     Years since quittin.2     Smokeless tobacco: Never Used   Substance Use Topics     Alcohol use: No        Additional history: as documented    Reviewed  and updated as needed this visit by clinical staff  Tobacco  Allergies  Meds  Med Hx  Surg Hx  Fam Hx  Soc Hx        Reviewed and updated as needed this visit by Provider         Patient Active Problem List   Diagnosis     Moderate major depression (H)     CARDIOVASCULAR SCREENING; LDL GOAL LESS THAN 160     Acrodermatitis enteropathica     ASCUS of cervix with negative high risk HPV     Bipolar affective disorder in remission (H)     Other migraine without status migrainosus, not intractable     Past Surgical History:   Procedure Laterality Date      SECTION  2012    Procedure:  SECTION;;  Surgeon: Aleskandra Lin MD;  Location:  [facility]       Social History     Tobacco Use     Smoking status: Former Smoker     Packs/day: 0.50     Years: 9.00     Pack years: 4.50     Types: Cigarettes     Last attempt to quit: 2012     Years since quittin.2     Smokeless tobacco: Never Used   Substance Use Topics     Alcohol use: No     Family History   Problem Relation Age of Onset     Cerebrovascular Disease Maternal Grandfather      MAYTE. Maternal Grandfather       "Musculoskeletal Disorder Mother 47        MS     Thyroid Cancer Father      Cancer Paternal Grandfather            ROS:  Constitutional, HEENT, cardiovascular, pulmonary, GI, , musculoskeletal, neuro, skin, endocrine and psych systems are negative, except as otherwise noted.    OBJECTIVE:     /65   Pulse 60   Temp 98  F (36.7  C) (Oral)   Ht 1.67 m (5' 5.75\")   Wt 67.1 kg (148 lb)   SpO2 99%   BMI 24.07 kg/m    Body mass index is 24.07 kg/m .  GENERAL: healthy, alert and no distress  EYES: Eyes grossly normal to inspection, PERRL and conjunctivae and sclerae normal  HENT: ear canals and TM's normal, nose and mouth without ulcers or lesions  NECK: no adenopathy, no asymmetry, masses, or scars and thyroid normal to palpation  RESP: lungs clear to auscultation - no rales, rhonchi or wheezes  CV: regular rate and rhythm, normal S1 S2, no S3 or S4, no murmur, click or rub, no peripheral edema and peripheral pulses strong  ABDOMEN: soft, nontender, no hepatosplenomegaly, no masses and bowel sounds normal  MS: no gross musculoskeletal defects noted, no edema  SKIN:10 X 7 erythema square looks like dermattis  3.5 X 4 cm swelling  With erythema and tenderness    Diagnostic Test Results:  No results found for this or any previous visit (from the past 24 hour(s)).    ASSESSMENT/PLAN:       ICD-10-CM    1. Cellulitis of arm, left L03.114 doxycycline hyclate (VIBRAMYCIN) 100 MG capsule   cellulitis-use antibiotics  Marked it and if worsening go to ED   Otherwise make an appoint with provider Thursday or Friday    See Patient Instructions    Genevieve Hunt DO  [facility]        "

## 2019-04-30 NOTE — PATIENT INSTRUCTIONS
Put heat  Don't poke it  Take antibiotics  Nayan it and if worsening go to ED   Otherwise make an appoint with provider Thursday or Friday  Genevieve Hunt D.O.

## 2019-05-24 ENCOUNTER — TELEPHONE (OUTPATIENT)
Dept: FAMILY MEDICINE | Facility: CLINIC | Age: 34
End: 2019-05-24

## 2019-05-24 NOTE — TELEPHONE ENCOUNTER
"Routing to provider to please advise. Patient requests doxycycline hyclate (VIBRAMYCIN) 100 MG capsule. Do you want to see patient again in clinic? I recommended this, patient is requesting that I check with you first.     Patient last saw you in clinic on 4/30 for cellulitis of the left arm, note pasted below.  Reached out to patient to clarify request. Patient states the cellulitis on her left arm is back to looking the same way it was at the office visit with Dr. Hunt on 4/30/19. Patient reports that the area is about the size of her hand and is right at the bend of her elbow. Patient reports it is a little hard to see/describe since she has tattoos.   Recommended that patient make and appointment to be evaluated or present to urgent care. Patient requested that I route to provider prior to her making an appointment.    Yadi Huang RN      \"1. Cellulitis of arm, left L03.114 doxycycline hyclate (VIBRAMYCIN) 100 MG capsule   cellulitis-use antibiotics  Marked it and if worsening go to ED   Otherwise make an appoint with provider Thursday or Friday     See Patient Instructions     Genevieve Hunt, DO  [facility]\"                    "

## 2019-05-24 NOTE — TELEPHONE ENCOUNTER
Reason for call:  Patient reporting a symptom    Symptom or request: MRSA    Duration (how long have symptoms been present): Unknown    Have you been treated for this before? Yes    Additional comments: Patient stated she has been treated for this before and prescribed antibiotics and she is having the same symptoms, she would like to know if she could be prescribed the same antibiotics without having to be seen. Patient also stated she could not remember the name of the medication she was prescribed before. Please call patient back to discuss.    Phone Number patient can be reached at:  Home number on file [phone] (home)    Best Time:  Anytime    Can we leave a detailed message on this number:  YES    Call taken on 5/24/2019 at 2:48 PM by Magaly Chadwick

## 2019-05-24 NOTE — TELEPHONE ENCOUNTER
She needs to be seen. She needs to be seen in urgent care for acute eval and pcp for long term follow up -may be ID needs to see her.  Genevieve Hunt D.O.

## 2019-05-29 ENCOUNTER — OFFICE VISIT (OUTPATIENT)
Dept: NEUROPSYCHOLOGY | Facility: CLINIC | Age: 34
End: 2019-05-29
Attending: FAMILY MEDICINE
Payer: COMMERCIAL

## 2019-05-29 DIAGNOSIS — F41.9 CHRONIC ANXIETY: ICD-10-CM

## 2019-05-29 DIAGNOSIS — R41.3 COMPLAINT OF MEMORY DISORDER WITHOUT OBSERVED OBJECTIVE MEMORY DEFICIT: Primary | ICD-10-CM

## 2019-05-29 DIAGNOSIS — F33.2 SEVERE RECURRENT MAJOR DEPRESSION WITHOUT PSYCHOTIC FEATURES (H): ICD-10-CM

## 2019-05-29 NOTE — PROGRESS NOTES
The patient was seen for neuropsychological evaluation at the request of Dennise Khan for the purposes of diagnostic clarification and treatment planning. 115 minutes of test administration and scoring were provided by this writer, Stephani Petersen.  Please see Dr. Ethan Koch's report for a full interpretation of the findings.

## 2019-06-10 ENCOUNTER — TELEPHONE (OUTPATIENT)
Dept: FAMILY MEDICINE | Facility: CLINIC | Age: 34
End: 2019-06-10

## 2019-06-10 NOTE — TELEPHONE ENCOUNTER
Panel Management Review      Patient has the following on her problem list: see below      Composite cancer screening  Chart review shows that this patient is due/due soon for the following None  Summary:    Patient is due/failing the following:   See below    Action needed:   See below    Type of outreach:    Sent [employer] message.    Questions for provider review:    None                                                                                                                                    Laurie Kelley Certified Medical Assistant (AAMA)      Chart routed to Care Team .

## 2019-06-10 NOTE — TELEPHONE ENCOUNTER
Panel Management Review      Patient has the following on her problem list:     Depression / Dysthymia review    Measure:  Needs PHQ-9 score of 4 or less during index window.  Administer PHQ-9 and if score is 5 or more, send encounter to provider for next steps.    12 month window range: 3/4-7/2/19    PHQ-9 SCORE 11/26/2012 5/3/2018 1/9/2019   PHQ-9 Total Score 1 - -   PHQ-9 Total Score - 12 14       If PHQ-9 recheck is 5 or more, route to provider for next steps.    Patient is due for:  PHQ9      Composite cancer screening  Chart review shows that this patient is due/due soon for the following None  Summary:    Patient is due/failing the following:   PHQ9 and PHYSICAL    Action needed:   Patient needs office visit for preventive care. and Patient needs to do PHQ9.    Type of outreach:    Routed to care team for outreach    Questions for provider review:    None                                                                                                                                    Emma Deras,        Chart routed to Care Team .

## 2019-06-10 NOTE — LETTER
[facility]  [address] [place] [id number] [phone]                                                                                                June 17, 2019    Alexandra Ruiz  [facility] [place] [id number]              Dear MsAp Joseph,    At [facility] we care about your health and well-being. A review of your chart has indicated that you are due for a(n) complete physical exam and depression follow up.  Please contact us at [phone] to schedule your appointment. If a referral is required for the test, a copy is enclosed with this letter.    If you have already had one or all of the above screening tests at another facility, please call us to update your chart.     You may contact the clinic at [phone] if you have any questions or concerns about this request.      Sincerely,      Monik Lew PA-C/sa
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
## 2019-06-15 NOTE — PROGRESS NOTES
Name: Alexandra Ruiz MRN: [id number]  : 1985  HU: 2019  Staff: ANIL  Tech: ALEX Age: 33  Sex: Female  Hand: Right Educ: 13-15  Vision: 20/20 ?without correction    DCT Err: 0 UG: 3  E-score: 4    WRAT4   SS %ile Grade Equiv.     Word Reading  103 58 12.9    WASI-2 FSIQ-2: 105 Raw T     Vocabulary  42 55     Matrix Reasoning 21 51    COWAT (FAS)   Raw: 44   SS: 11 T: 47    ANIMAL NAMING TEST   Raw: 20   SS: 10 T: 39    BOSTON NAMING TEST   Raw: 53   SS: 8 T: 38     GROOVED PEGBOARD    Raw  Drops SS T   RH  61  0 11 45   LH 65  0 11 46    TRAILS  Raw  Err SS T   A 17  1 14 60   B 42  0 14 59    STROOP Raw T   Word 100 46   Color  91 57       Color/Word  58 63    PORTEUS MAZE TEST   Test Age: 15.5    WCST (64 cards) Raw %ile   Categories: 5 >16  Total Correct: 55   Total Errors: 9 73   Persev. Err.: 5 58   Concept. Resp.: 52 50   FTMS:  0   LTL:  -1.89 >16    REYNA-O    Raw    T %ile     Time to Copy  113      >16     Copy    34     >16     Short Delay Recall 21.5 46 34     Long Delay Recall 23 49 46     Recognition Total 22 54 66    AVLT (<55, Lechuga metanorms)     Trial 1 2 3 4 5 B 6 30  60              3 4 9 11 13 5 8 12 12      Raw M(SD)     Trial 5    13 12.7(1.9)     Short Delay Recall  8 11.2(2.7)     30  Recall   12 11.1(2.8)     60  Recall   12     30  Recognition Hits/FPs  15/2 14.2(1.2)     60  Recognition Hits/FPs  14/1     AVLT-X Exaggeration Index 0    BDI-II  Raw:  36  Interpretation: Severe    STAI  S: 47; %ile: 84 Interpretation: mild/high average  T: 63; %ile: 100 Interpretation: severe    MMPI-2-RF   RCd: 73 VRIN-r:  39   RC1: 79 PRISCILLA-r:  57   RC2: 58 F-r:  83   RC3: 57 Fp-r:  51   RC4: 71 Fs:  83   RC6: 61 FBS-r:  67   RC7: 62 RBS:  67   RC8: 63 L-r:  47   RC9: 66 K-r:  38

## 2019-06-17 NOTE — TELEPHONE ENCOUNTER
Letter mailed, pt did not read SYLLETA message.    Laurie Kelley, Certified Medical Assistant (AAMA)

## 2019-06-18 ENCOUNTER — PATIENT OUTREACH (OUTPATIENT)
Dept: CARE COORDINATION | Facility: CLINIC | Age: 34
End: 2019-06-18

## 2019-06-18 ENCOUNTER — OFFICE VISIT (OUTPATIENT)
Dept: FAMILY MEDICINE | Facility: CLINIC | Age: 34
End: 2019-06-18
Payer: COMMERCIAL

## 2019-06-18 VITALS
DIASTOLIC BLOOD PRESSURE: 70 MMHG | SYSTOLIC BLOOD PRESSURE: 118 MMHG | HEART RATE: 68 BPM | WEIGHT: 140.2 LBS | TEMPERATURE: 98.3 F | HEIGHT: 66 IN | BODY MASS INDEX: 22.53 KG/M2

## 2019-06-18 DIAGNOSIS — L02.219 CELLULITIS AND ABSCESS OF TRUNK: Primary | ICD-10-CM

## 2019-06-18 DIAGNOSIS — Z59.00 HOMELESSNESS: ICD-10-CM

## 2019-06-18 DIAGNOSIS — L03.319 CELLULITIS AND ABSCESS OF TRUNK: Primary | ICD-10-CM

## 2019-06-18 PROCEDURE — 99213 OFFICE O/P EST LOW 20 MIN: CPT | Performed by: FAMILY MEDICINE

## 2019-06-18 RX ORDER — DOXYCYCLINE HYCLATE 100 MG
100 TABLET ORAL 2 TIMES DAILY
Qty: 20 TABLET | Refills: 0 | Status: SHIPPED | OUTPATIENT
Start: 2019-06-18 | End: 2019-06-28

## 2019-06-18 SDOH — ECONOMIC STABILITY - HOUSING INSECURITY: HOMELESSNESS UNSPECIFIED: Z59.00

## 2019-06-18 ASSESSMENT — MIFFLIN-ST. JEOR: SCORE: 1363.72

## 2019-06-18 ASSESSMENT — PATIENT HEALTH QUESTIONNAIRE - PHQ9: SUM OF ALL RESPONSES TO PHQ QUESTIONS 1-9: 17

## 2019-06-18 NOTE — PATIENT INSTRUCTIONS
Patient Education     Abscess (Antibiotic Treatment Only)  An abscess (sometimes called a  boil ) happens when bacteria get trapped under the skin and start to grow. Pus forms inside the abscess as the body responds to the bacteria. An abscess can happen with an insect bite, ingrown hair, blocked oil gland, pimple, cyst, or puncture wound.  In the early stages, your wound may be red and tender. For this stage, you may get antibiotics. If the abscess does not get better with antibiotics, it will need to be drained with a small cut.  Home care  These tips will help you care for your abscess at home:    Soak the wound in hot water or apply hot packs (small towel soaked in hot water) to the area for 20 minutes at a time. Do this 3 to 4 times a day.    Do not cut, squeeze, or pop the boil yourself.    Apply antibiotic cream or ointment to the skin 3 to 4 times a day, unless something else was prescribed. Some ointments include an antibiotic plus a pain reliever.    If your doctor prescribed antibiotics, do not stop taking them until you have finished the medicine or the doctor tells you to stop.    You may use an over-the-counter pain medicine to control pain, unless another pain medicine was prescribed. If you have chronic liver or kidney disease or ever had a stomach ulcer or gastrointestinal bleeding, talk with your doctor before using these any of these.  Follow-up care  Follow up with your healthcare provider, or as advised. Check your wound each day for the signs of worsening infection listed below.  When to seek medical advice  Get prompt medical attention if any of these occur:    An increase in redness or swelling    Red streaks in the skin leading away from the abscess    An increase in local pain or swelling    Fever of 100.4 F (38 C) or higher, or as directed by your healthcare provider    Pus or fluid coming from the abscess    Boil returns after getting better  Date Last Reviewed: 9/1/2016 2000-2018  [employer]. [address] [place] [address], [place] [address]. All rights reserved. This information is not intended as a substitute for professional medical care. Always follow your healthcare professional's instructions.

## 2019-06-18 NOTE — PROGRESS NOTES
Subjective     Alexandra Ruiz is a 33 year old female who presents to clinic today for the following health issues:    HPI   Concern -  Concerns for Staph infection  Onset: 2 days    Description:   Noticed bump on right upper chest and it popped- drained pus     Intensity: moderate, severe    Progression of Symptoms:  worsening    Accompanying Signs & Symptoms:  Redness/ swelling, painful to the touch    Previous history of similar problem:   MRSA in the past     Precipitating factors:   Worsened by: Touch, clothes    Alleviating factors:  Improved by: None    Therapies Tried and outcome: None    Patient is also concerned because she will become homeless on Monday if she is unable to find new housing by then.  Her current house is a rental and her landlord sold the unit.  She has not been able to find affordable housing to move into.      Patient Active Problem List   Diagnosis     Moderate major depression (H)     CARDIOVASCULAR SCREENING; LDL GOAL LESS THAN 160     Acrodermatitis enteropathica     ASCUS of cervix with negative high risk HPV     Bipolar affective disorder in remission (H)     Other migraine without status migrainosus, not intractable     Past Surgical History:   Procedure Laterality Date      SECTION  2012    Procedure:  SECTION;;  Surgeon: Aleksandra Lin MD;  Location:  L[facility]D       Social History     Tobacco Use     Smoking status: Former Smoker     Packs/day: 0.50     Years: 9.00     Pack years: 4.50     Types: Cigarettes     Last attempt to quit: 2012     Years since quittin.4     Smokeless tobacco: Never Used   Substance Use Topics     Alcohol use: No     Family History   Problem Relation Age of Onset     Cerebrovascular Disease Maternal Grandfather      C.A.D. Maternal Grandfather      Musculoskeletal Disorder Mother 47        MS     Thyroid Cancer Father      Cancer Paternal Grandfather            Reviewed and updated as needed this visit by  "Provider         Review of Systems   ROS COMP: Constitutional, HEENT, cardiovascular, pulmonary, gi and gu systems are negative, except as otherwise noted.      Objective    /70 (BP Location: Right arm, Patient Position: Sitting, Cuff Size: Adult Regular)   Pulse 68   Temp 98.3  F (36.8  C) (Oral)   Ht 1.67 m (5' 5.75\")   Wt 63.6 kg (140 lb 3.2 oz)   BMI 22.80 kg/m    Body mass index is 22.8 kg/m .  Physical Exam   GENERAL: healthy, alert and no distress  SKIN: Area of erythema measuring approx 4cm in diameter on right chest wall with central eschar and mild induration.  No fluctuance.    PSYCH: Patient is tearful when discussing her living situation         Assessment & Plan     1. Cellulitis and abscess of trunk  - Already drained on it's own, so no need for I+D  - Advised warm compress to help encourage continued drainage   - acetaminophen-codeine (TYLENOL #3) 300-30 MG tablet; Take 1 tablet by mouth every 6 hours as needed for severe pain  Dispense: 10 tablet; Refill: 0  - doxycycline hyclate (VIBRA-TABS) 100 MG tablet; Take 1 tablet (100 mg) by mouth 2 times daily for 10 days  Dispense: 20 tablet; Refill: 0    2. Homelessness  - Will have care coordination contact her   - CARE COORDINATION REFERRAL      Return in about 1 week (around 6/25/2019), or if symptoms worsen or fail to improve.    Dennise Khan, DO  [facility]    "

## 2019-07-03 ENCOUNTER — PATIENT OUTREACH (OUTPATIENT)
Dept: CARE COORDINATION | Facility: CLINIC | Age: 34
End: 2019-07-03

## 2019-07-03 NOTE — LETTER
Health Care Home - Access Care Plan    About Me:    Patient Name:  Alexandra Ruiz    YOB: 1985  Age:                      33 year old   Svitlana MRN:     [id number] Telephone Information:   Home Phone [phone]   Mobile [phone]       Address:  [facility] [place] [address] Email address:  [email or web address]      Emergency Contact(s)   Name Relationship Lgl Grd Work Phone Home Phone Mobile Phone   1. SERVANDO WYLIE Significant ot* No none [phone]             Health Maintenance:   Health Maintenance Due   Topic Date Due     PREVENTIVE CARE VISIT  1985     DEPRESSION ACTION PLAN  1985       My Access Plan  Medical Emergency 911   Questions or concerns during clinic hours Primary Clinic Line, I will call the clinic directly: [facility] [phone]   24 Hour Appointment Line [phone] or  [phone]) (toll free)   24 Hour Nurse Line [phone] (toll free)   Questions or concerns outside clinic hours 24 Hour Appointment Line, I will call the after-hours on-call line:   [facility] [phone] or [phone]) (toll-free)   Preferred Urgent Care  Not assessed    Preferred Hospital  Not assessed   Preferred Pharmacy [id number] - [place], [place] - [address] [facility]     Behavioral Health Crisis Line The National Suicide Prevention Lifeline at 1-534.972.3992 or 917                     My Care Team Members  Patient Care Team       Relationship Specialty Notifications Start End    Monik Lew PA-C PCP - General Physician Assistant  9/22/17     Phone: [phone] Fax: [phone]         [facility] [address]    Gemini Hernadez BSW Clinic Care Coordinator Primary Care - CC  6/18/19      Care Coordination [facility]    Dennise Khan, DO Assigned PCP   6/23/19     Phone: [phone] Fax: [phone]         [facility] [place]  Ilir DRISCOLL [facility] [address]           My Medical and Care Information  Problem List   Patient Active Problem List   Diagnosis     Moderate major depression (H)     CARDIOVASCULAR SCREENING; LDL GOAL LESS THAN 160     Acrodermatitis enteropathica     ASCUS of cervix with negative high risk HPV     Bipolar affective disorder in remission (H)     Other migraine without status migrainosus, not intractable

## 2019-07-03 NOTE — LETTER
[place] CARE COORDINATION    July 3, 2019    Alexandra Ruiz  [address] [place] [id number]      Dear Alexandra,    I am a clinic care coordinator who works with Monik Lew PA-C at the [facility]. I wanted to introduce myself and provide you with my contact information so that you can call me with questions or concerns about your health care. Below is a description of clinic care coordination and how I can further assist you.     The clinic care coordinator is a registered nurse and/or  who understand the health care system. The goal of clinic care coordination is to help you manage your health and improve access to the [facility] in the most efficient manner. The registered nurse can assist you in meeting your health care goals by providing education, coordinating services, and strengthening the communication among your providers. The  can assist you with financial, behavioral, psychosocial, chemical dependency, counseling, and/or psychiatric resources.    Please feel free to contact me at [phone] with any questions or concerns. We at [facility] are focused on providing you with the highest-quality healthcare experience possible and that all starts with you.     Sincerely,     Gemini Hernadez, MARIA A, MSW    Clinical Care Coordination  [facility]  [phone]    Enclosed: I have enclosed a copy of a 24 Hour Access Plan. This has helpful phone numbers for you to call when needed. Please keep this in an easy to access place to use as needed.

## 2019-07-03 NOTE — PROGRESS NOTES
Clinic Care Coordination Contact--Social Work Initial Call   [facility]/Voicemail       Clinical Data: Care Coordinator Outreach.  Clinical Data: Care Coordinator Outreach. Care team referral for housing.  Per this referral, Patient currently rents and landlord has sold the property.  She has been unable to find affordable housing and will be homeless on Monday if does not find a solution.  Patient was seen in clinic for concerns of staph infection.       Outreach attempted x 1 on mobile # listed, this # is busy x2 after 1 ring.  Call to other mobile # listed, this # is immediately busy x2     Outreach attempted x 1.  Cannot leave a message today.     Plan: Care Coordinator mailed out care coordination introduction letter today. Care Coordinator will try to reach patient again in 3-5 business days.    MARIA A Medrano, MSW   [facility]   [phone]  7/3/2019 4:50 PM

## 2019-07-22 ENCOUNTER — PATIENT OUTREACH (OUTPATIENT)
Dept: CARE COORDINATION | Facility: CLINIC | Age: 34
End: 2019-07-22

## 2019-07-22 ASSESSMENT — ACTIVITIES OF DAILY LIVING (ADL): DEPENDENT_IADLS:: INDEPENDENT

## 2019-09-04 NOTE — PROGRESS NOTES
Clinic Care Coordination Contact    Pt has not been able to be reached after multiple attempts on different phones.  She has not responded to letter sent on 7-3-19.    Will close to care coordination at this time as not being reached.     MARIA A Good, [facility] - Care Coordinator   [facility]  9/4/2019   4:01 PM  [phone]

## 2020-03-02 ENCOUNTER — HEALTH MAINTENANCE LETTER (OUTPATIENT)
Age: 35
End: 2020-03-02

## 2020-03-20 ENCOUNTER — VIRTUAL VISIT (OUTPATIENT)
Dept: FAMILY MEDICINE | Facility: OTHER | Age: 35
End: 2020-03-20

## 2020-03-20 NOTE — PROGRESS NOTES
"Date: 2020 07:43:17  Clinician: Astrid Cantu  Clinician NPI: [id number]  Patient: Alexandra Ruiz  Patient : 1985  Patient Address: [address] [place] [address]  Patient Phone: [phone]  Visit Protocol: URI  Patient Summary:  Alexandra is a 34 year old ( : 1985 ) female who initiated a Visit for COVID-19 (Coronavirus) evaluation and screening. When asked the question \"Please sign me up to receive news, health information and promotions. \", Alexandra responded \"No\".    Alexandra states her symptoms started 1-2 days ago.   Her symptoms consist of rhinitis, myalgia, a sore throat, a cough, nasal congestion, malaise, and chills. She is experiencing difficulty breathing due to nasal congestion but she is not short of breath.   Symptom details     Nasal secretions: The color of her mucus is clear and white.    Cough: Alexandra coughs a few times an hour and her cough is not more bothersome at night. Phlegm comes into her throat when she coughs. She does not believe her cough is caused by post-nasal drip. The color of the phlegm is yellow, white, and clear.     Sore throat: Alexandra reports having mild throat pain (1-3 on a 10 point pain scale), does not have exudate on her tonsils, and can swallow liquids. The lymph nodes in her neck are not enlarged. A rash has not appeared on the skin since the sore throat started.      Alexandra denies having headache, fever, ear pain, enlarged lymph nodes, facial pain or pressure, wheezing, and teeth pain. She also denies having recent facial or sinus surgery in the past 60 days and taking antibiotic medication for the symptoms.   Precipitating events  Alexandra is not sure if she has been exposed to someone with strep throat. She has not recently been exposed to someone with influenza. Alexandra has not been in close contact with any high risk individuals.   Pertinent COVID-19 (Coronavirus) information  Alexandra has not traveled internationally or to the areas where COVID-19 " (Coronavirus) is widespread, including cruise ship travel in the last 14 days before the start of her symptoms.   Alexandra has not had a close contact with a laboratory-confirmed COVID-19 patient within 14 days of symptom onset. She also has not had a close contact with a suspected COVID-19 patient within 14 days of symptom onset.   Alexandra is not a healthcare worker and does not work in a healthcare facility.   Pertinent medical history  Alexandra does not get yeast infections when she takes antibiotics.   Alexandra does not need a return to work/school note.   Weight: 135 lbs   Alexandra does not smoke or use smokeless tobacco.   She denies pregnancy and denies breastfeeding. She has menstruated in the past month.   Weight: 135 lbs    MEDICATIONS: lamotrigine oral, Seroquel oral, duloxetine oral, ALLERGIES: Ceclor, Penicillins  Clinician Response:  Dear Alexandra,   Based on the information you have provided, you do have symptoms that are consistent with Coronavirus (COVID-19).  The coronavirus causes mild to severe respiratory illness with the most common symptoms including fever, cough and difficulty breathing. Unfortunately, many viruses cause similar symptoms and it can be difficult to distinguish between viruses, especially in mild cases, so we are presuming that anyone with cough or fever has coronavirus at this time.  Coronavirus/COVID-19 has reached the point of community spread in [place], meaning that we are finding the virus in people with no known exposure risk for yadira the virus. Given the increasing commonness of coronavirus in the community we are no longer testing patients who are not critically ill.  If you are a health care worker, you should refer to your employee health office for instructions about returning to work.  For everyone else who has cough or fever, you should assume you are infected with coronavirus. Accordingly, you should self-quarantine for seven days from the first day your symptoms started  OR 72 hours after your cough and fever completely resolve - WHICHEVER is LONGER. You should call if you find increasing shortness of breath, wheezing or sustained fever above 101.5. If you are significantly short of breath or experience chest pain you should call 911 or report to the nearest emergency department for urgent evaluation.    Isolate yourself at home.   Do Not allow any visitors  Do Not go to work or school  Do Not go to Orthodox,  centers, shopping, or other public places.  Do Not shake hands.  Avoid close contact with others (hugging, kissing).   Protect Others:    Cover Your Mouth and Nose with a mask, disposable tissue or wash cloth to avoid spreading germs to others.  Wash your hands and face frequently with soap and water.   If you develop significant shortness of breath that prevents you from doing normal activities, please call 911 or proceed to the nearest emergency room and alert them immediately that you have been in self-isolation for possible coronavirus.   For more information about COVID19 and options for caring for yourself at home, please visit the CDC website at https://www.cdc.gov/coronavirus/2019-ncov/about/steps-when-sick.htmlFor more options for care at [facility], please visit our website at [email or web address]     Diagnosis: Cough  Diagnosis ICD: R05

## 2020-03-24 ENCOUNTER — VIRTUAL VISIT (OUTPATIENT)
Dept: FAMILY MEDICINE | Facility: CLINIC | Age: 35
End: 2020-03-24
Payer: MEDICAID

## 2020-03-24 DIAGNOSIS — L90.4: Primary | ICD-10-CM

## 2020-03-24 PROCEDURE — 99441 ZZC PHYSICIAN TELEPHONE EVALUATION 5-10 MIN: CPT | Performed by: NURSE PRACTITIONER

## 2020-03-24 RX ORDER — DEXTROAMPHETAMINE SACCHARATE, AMPHETAMINE ASPARTATE, DEXTROAMPHETAMINE SULFATE AND AMPHETAMINE SULFATE 5; 5; 5; 5 MG/1; MG/1; MG/1; MG/1
20 TABLET ORAL 2 TIMES DAILY
COMMUNITY

## 2020-03-24 RX ORDER — UREA 10 %
LOTION (ML) TOPICAL
Qty: 90 TABLET | Refills: 0 | Status: SHIPPED | OUTPATIENT
Start: 2020-03-24

## 2020-03-24 NOTE — PROGRESS NOTES
"Alexandra Ruiz is a 34 year old female who is being evaluated via a billable telephone visit.      The patient has been notified of following:     \"This telephone visit will be conducted via a call between you and your physician/provider. We have found that certain health care needs can be provided without the need for a physical exam.  This service lets us provide the care you need with a short phone conversation.  If a prescription is necessary we can send it directly to your pharmacy.  If lab work is needed we can place an order for that and you can then stop by our lab to have the test done at a later time.    If during the course of the call the physician/provider feels a telephone visit is not appropriate, you will not be charged for this service.\"     Alexandra Ruiz complains of    Chief Complaint   Patient presents with     Refill Request     Zinc. has not had Blood work done since Childhood     flare up     Hx of acrodermatitis enteropathica since Childhood. patient has called several stores but unfortunately they have sold out of the Zinc supplement       I have reviewed and updated the patient's Past Medical History, Social History, Family History and Medication List.    ALLERGIES  Ceclor [cefaclor monohydrate] and Penicillins      Additional provider notes:   Acrodermatitis enteropathica-very rare form of zinc deficiency she has been out of zinc for 2 weeks she has a flare now with blisters and pimples appear around nose, ears, genitals, elbows feet.  She is tried calling 7 pharmacies and she cannot finding anywhere given the coronavirus.    Assessment/Plan:  1. Acrodermatitis atrophicans chronica  I will try to prescribe it to 1 of [place] pharmacies in hopes that we have zinc sulfate in stock.  Discussed diet exercise stress reducing activities.  - Zinc Sulfate 220 (50 Zn) MG TABS; TAke 1 tablet daily  Dispense: 90 tablet; Refill: 0    Phone call duration:  7:19 minutes    Nicole Bah, " APRN CNP

## 2020-05-05 LAB — PHQ9 SCORE: 9

## 2020-05-26 ENCOUNTER — TRANSFERRED RECORDS (OUTPATIENT)
Dept: HEALTH INFORMATION MANAGEMENT | Facility: CLINIC | Age: 35
End: 2020-05-26

## 2020-06-22 NOTE — NURSING NOTE
"Chief Complaint   Patient presents with     [place] Administration       Initial There were no vitals taken for this visit. Estimated body mass index is 23.24 kg/(m^2) as calculated from the following:    Height as of 5/20/14: 5' 6\" (1.676 m).    Weight as of 5/20/14: 144 lb (65.3 kg).  Medication Reconciliation: unable or not appropriate to perform    "
Poor

## 2020-07-23 ENCOUNTER — TRANSFERRED RECORDS (OUTPATIENT)
Dept: HEALTH INFORMATION MANAGEMENT | Facility: CLINIC | Age: 35
End: 2020-07-23

## 2020-12-14 ENCOUNTER — HEALTH MAINTENANCE LETTER (OUTPATIENT)
Age: 35
End: 2020-12-14

## 2021-02-09 NOTE — PROGRESS NOTES
Clinic Care Coordination Contact--Social Work Initial Call   [facility]/Voicemail    Clinical Data: Care Coordinator Outreach.  Care team referral for housing.  Per this referral, Patient currently rents and landlord has sold the property.  She has been unable to find affordable housing and will be homeless on Monday if does not find a solution.  Patient was seen in clinic for concerns of staph infection.      Outreach attempted x 1 on mobile # listed, this # is busy x2 after 1 ring.  Call to other mobile # listed, this # is immediately busy x2     Plan: Care Coordinator did not mail out introduction letter today. Care Coordinator will try to reach patient again in 1-2 business days.  Will update provider who made this referral     MARIA A Medrano, FAVIOLA   [facility]   [phone]  6/18/2019 1:46 PM    Clinic Care Coordination Contact--Social Work Initial Call   [facility]/Vo[place]email    Clinical Data: Care Coordinator Outreach.  Care team referral for housing.  Per this referral, Patient currently rents and landlord has sold the property.  She has been unable to find affordable housing and will be homeless on Monday if does not find a solution.  Patient was seen in clinic for concerns of staph infection.      Outreach attempted x 1 on mobile # listed, this # is busy x2 after 1 ring.  Call to other mobile # listed, this # is immediately busy x2.  Today again busy    Plan: Care Coordinator did not mail out introduction letter today. Care Coordinator will call Patient in 3-5 business days     MARIA A Medrano, FAVIOLA   [facility]   [phone]  6/26/2019 11:44 AM              Received pt resting comfortably in bed. PT is A&Ox4 . No complaints of pain and no s/s or respiratory distress.  when pt got to the floor. Oriented patient to unit/room. Call light and personal belongings within reach. Bed locked and safety precautions in place. Notified MD pt was here. No orders put in yet. Will continue to monitor.

## 2021-04-18 ENCOUNTER — HEALTH MAINTENANCE LETTER (OUTPATIENT)
Age: 36
End: 2021-04-18

## 2021-10-02 ENCOUNTER — HEALTH MAINTENANCE LETTER (OUTPATIENT)
Age: 36
End: 2021-10-02

## 2022-05-14 ENCOUNTER — HEALTH MAINTENANCE LETTER (OUTPATIENT)
Age: 37
End: 2022-05-14

## 2022-09-03 ENCOUNTER — HEALTH MAINTENANCE LETTER (OUTPATIENT)
Age: 37
End: 2022-09-03

## 2023-06-03 ENCOUNTER — HEALTH MAINTENANCE LETTER (OUTPATIENT)
Age: 38
End: 2023-06-03

## 2024-04-30 NOTE — PROGRESS NOTES
Clinic Care Coordination Contact--Social Work Initial Call   [facility]/Voicemail        Clinical Data: Care Coordinator Outreach.  Clinical Data: Care Coordinator Outreach. Care team referral for housing.  Per this referral, Patient currently rents and landlord has sold the property. She has been unable to find affordable housing and will be homeless on Monday if does not find a solution.  Patient was seen in clinic for concerns of staph infection.       Outreach attempted x 1 on mobile # listed, this # is busy x2 after 1 ring.  Call to other mobile # listed, this # is immediately busy x2     SW was able to leave a message on voice mail for Patient, left a vague message as did not identify her.    Plan: 1) SW will call Patient in 1 week if no return call     MARIA A Medrano, MSW   [facility]   [phone]  7/22/2019 4:29 PM   Spoke with mom - assisted in scheduling med f/u appt. Informed parent to send weight via portal before appt. Parent verbalized understanding and had no further questions at this time.